# Patient Record
Sex: FEMALE | Race: WHITE | NOT HISPANIC OR LATINO | URBAN - METROPOLITAN AREA
[De-identification: names, ages, dates, MRNs, and addresses within clinical notes are randomized per-mention and may not be internally consistent; named-entity substitution may affect disease eponyms.]

---

## 2017-06-22 ENCOUNTER — EMERGENCY (EMERGENCY)
Facility: HOSPITAL | Age: 32
LOS: 1 days | End: 2017-06-22
Payer: MEDICAID

## 2017-06-22 PROCEDURE — 76817 TRANSVAGINAL US OBSTETRIC: CPT | Mod: 26

## 2017-06-22 PROCEDURE — 99285 EMERGENCY DEPT VISIT HI MDM: CPT

## 2017-06-22 PROCEDURE — 76801 OB US < 14 WKS SINGLE FETUS: CPT | Mod: 26

## 2018-05-13 ENCOUNTER — EMERGENCY (EMERGENCY)
Facility: HOSPITAL | Age: 33
LOS: 1 days | End: 2018-05-13
Payer: MEDICAID

## 2018-05-13 PROCEDURE — 99283 EMERGENCY DEPT VISIT LOW MDM: CPT

## 2019-11-29 ENCOUNTER — EMERGENCY (EMERGENCY)
Facility: HOSPITAL | Age: 34
LOS: 1 days | End: 2019-11-29
Admitting: EMERGENCY MEDICINE
Payer: MEDICAID

## 2019-11-29 ENCOUNTER — TRANSCRIPTION ENCOUNTER (OUTPATIENT)
Age: 34
End: 2019-11-29

## 2019-11-29 PROCEDURE — 74176 CT ABD & PELVIS W/O CONTRAST: CPT | Mod: 26

## 2019-11-29 PROCEDURE — 99285 EMERGENCY DEPT VISIT HI MDM: CPT

## 2019-11-29 PROCEDURE — 76705 ECHO EXAM OF ABDOMEN: CPT | Mod: 26

## 2019-11-29 PROCEDURE — 76830 TRANSVAGINAL US NON-OB: CPT | Mod: 26

## 2019-11-29 PROCEDURE — 76856 US EXAM PELVIC COMPLETE: CPT | Mod: 26

## 2019-11-30 ENCOUNTER — EMERGENCY (EMERGENCY)
Facility: HOSPITAL | Age: 34
LOS: 1 days | End: 2019-11-30
Admitting: EMERGENCY MEDICINE
Payer: MEDICAID

## 2019-11-30 PROCEDURE — 76856 US EXAM PELVIC COMPLETE: CPT | Mod: 26

## 2019-11-30 PROCEDURE — 99285 EMERGENCY DEPT VISIT HI MDM: CPT

## 2019-11-30 PROCEDURE — 76830 TRANSVAGINAL US NON-OB: CPT | Mod: 26

## 2021-01-06 ENCOUNTER — APPOINTMENT (OUTPATIENT)
Dept: GYNECOLOGIC ONCOLOGY | Facility: CLINIC | Age: 36
End: 2021-01-06
Payer: MEDICAID

## 2021-01-06 DIAGNOSIS — Z87.891 PERSONAL HISTORY OF NICOTINE DEPENDENCE: ICD-10-CM

## 2021-01-06 DIAGNOSIS — Z83.3 FAMILY HISTORY OF DIABETES MELLITUS: ICD-10-CM

## 2021-01-06 DIAGNOSIS — Z82.49 FAMILY HISTORY OF ISCHEMIC HEART DISEASE AND OTHER DISEASES OF THE CIRCULATORY SYSTEM: ICD-10-CM

## 2021-01-06 PROCEDURE — 93976 VASCULAR STUDY: CPT | Mod: 59

## 2021-01-06 PROCEDURE — 76830 TRANSVAGINAL US NON-OB: CPT | Mod: 59

## 2021-01-06 PROCEDURE — 76857 US EXAM PELVIC LIMITED: CPT | Mod: 59

## 2021-01-06 PROCEDURE — 99072 ADDL SUPL MATRL&STAF TM PHE: CPT

## 2021-01-06 PROCEDURE — 99204 OFFICE O/P NEW MOD 45 MIN: CPT | Mod: 25

## 2021-01-06 RX ORDER — LEVONORGESTREL AND ETHINYL ESTRADIOL 0.1-0.02MG
0.1-2 KIT ORAL
Refills: 0 | Status: ACTIVE | COMMUNITY

## 2021-01-06 NOTE — CHIEF COMPLAINT
[FreeTextEntry1] : [facility]\par \par [facility] [phone] at [address] [place] [id number]\par

## 2021-01-06 NOTE — HISTORY OF PRESENT ILLNESS
[FreeTextEntry1] : This 36yo  LMP 20 referred by Dr. Bergman for evaluation of a cyst on fallopian tube. History of right oophorectomy in 2019 and multiple laparoscopies with ovarian cystectomies over the years. She reports having an endometrioma removed in the past. On 20, patient underwent emergent laparoscopy at [facility] (Dr. Donna Bess) and was told a cyst was found in right fallopian tube ? rupture and appendix was also involved requiring appendectomy. Pt does not have operative or pathology records today.  Pt reports having chronic pelvic pain for years and feels it is significantly affecting her quality of life especially with taking care of her 3 small children. She has dyspareunia. Menses are heavy, painful and irregular with spotting between periods. She is eager to have relief of symptoms. \par \par Pap smear-2020-reports wnl, h/o abn paps, denies LEEP \par Mammo-never\par Colonoscopy-never  \par \par

## 2021-01-06 NOTE — REVIEW OF SYSTEMS
[Negative] : Musculoskeletal [Abn Vag Bleeding] : abnormal vaginal bleeding [Dyspareunia] : dyspareunia [FreeTextEntry4] : pelvic pain

## 2021-01-06 NOTE — END OF VISIT
[FreeTextEntry3] : Written by Minna SORENSON, acting as a scribe for Dr. Carl Lee.\par This note accurately reflects the work and decisions made by me.\par

## 2021-01-06 NOTE — ASSESSMENT
[FreeTextEntry1] : 36yo female with chronic pelvic pain and recurrent ovarian cysts. Pelvic US today reveals a left adnexal complex area 5.2 x 3.3  x4.7cm. Discussed with patient my recommendation for an MRI pelvis to better evaluate this cystic area and determine her source of pain. Will also need her operative and pathology reports.

## 2021-01-06 NOTE — PHYSICAL EXAM
[Normal] : Bimanual Exam: Normal [de-identified] : Patient was interviewed and examined with chaperone present. Name of chaperone: Minna Hatch

## 2021-02-12 ENCOUNTER — APPOINTMENT (OUTPATIENT)
Dept: GYNECOLOGIC ONCOLOGY | Facility: CLINIC | Age: 36
End: 2021-02-12
Payer: MEDICAID

## 2021-02-12 VITALS
HEART RATE: 89 BPM | HEIGHT: 67 IN | RESPIRATION RATE: 16 BRPM | DIASTOLIC BLOOD PRESSURE: 82 MMHG | BODY MASS INDEX: 29.82 KG/M2 | OXYGEN SATURATION: 98 % | WEIGHT: 190 LBS | SYSTOLIC BLOOD PRESSURE: 122 MMHG

## 2021-02-12 PROCEDURE — 99214 OFFICE O/P EST MOD 30 MIN: CPT

## 2021-02-12 PROCEDURE — 99072 ADDL SUPL MATRL&STAF TM PHE: CPT

## 2021-02-17 NOTE — ASSESSMENT
[FreeTextEntry1] : I discussed with patient that her MRI was WNL. There is no obvious evidence of gynecologic pathology. I explained to the patient that there are a few options that will help keeping her out of the ER  and needing more laparoscopic procedures. I explained to her that the probability of her needing another surgery is lower now that she has her appendix out. I discussed the option of pre dosing with Aleve prior to her cycle which is my least favorite option vs a hysterectomy which i discussed the surgical risk with patient and that at such a young age it is not recommended. I have taken the liberty of referring patient to Dr. Renea Amado for a consultation on what she thinks the best contraceptive would be for patient. Patient states she is willing to have a consultation with her. Otherwise patient is to follow up in June for a follow up ultrasound, I advised the patient if she is pain free she does not have to return to my office. She may resume routine gynecological care as long as she is feeling well. Patient understands that if she develops any symptoms or concerns where she feels like she needs to go to the ER she should call my personally and I will advise her to go to [facility] where my team will be. Patient stated she understood and agreed to comply.

## 2021-02-17 NOTE — REASON FOR VISIT
[FreeTextEntry1] : [place] Location \par \par [facility] [place]. [phone]\par [address] [place] [address]

## 2021-02-17 NOTE — PHYSICAL EXAM
[Normal] : No focal neurologic defects observed [de-identified] : Jaqueline Enamorado Medical assistant chaperoned during results and discussion.

## 2021-02-17 NOTE — HISTORY OF PRESENT ILLNESS
[FreeTextEntry1] : This 35 year old was recently referred by Dr. Amado for evaluation of a cyst on fallopian tube. Patient has a hx of right oophorectomy in 2019 and multiple laparscopies with ovarian cystectomies over the years. Patient was seen at [facility] and had an emergent Laparoscopy and was told a cyst was foung in her right fallopian tube ? rupture and appendix was also involved requiring appendectomy. Patient reports having chronic pelvic pain for years and feels like it is significantly affecting her quality of life. An ultrasound was performed in my office on 1/6/21 which revealed a left adnexal complex area 5.2 x 3.3 x 4.7 cm. I discussed with patient my recommendation to have an MRI and obtain operative and pathology results and return to my office to discuss results. Patient has been on multiple contraceptives and is currently on Lutera. Patient returns to te office today to discuss results and treatment plan. \par \par MRI of the pelvis from 2/3/21 impression revealed Normal findings, s/p right oophorectomy.

## 2021-02-17 NOTE — END OF VISIT
[FreeTextEntry3] : Written by Jaqueline Enamorado, acting as a scribe for Dr. Carl Lee \par This note accurately reflects the work and decisions made by me.

## 2021-02-22 ENCOUNTER — EMERGENCY (EMERGENCY)
Facility: HOSPITAL | Age: 36
LOS: 1 days | Discharge: HOME | End: 2021-02-22
Attending: EMERGENCY MEDICINE
Payer: MEDICAID

## 2021-02-22 VITALS
RESPIRATION RATE: 18 BRPM | HEIGHT: 67 IN | HEART RATE: 81 BPM | OXYGEN SATURATION: 100 % | WEIGHT: 190.04 LBS | TEMPERATURE: 98 F | DIASTOLIC BLOOD PRESSURE: 87 MMHG | SYSTOLIC BLOOD PRESSURE: 132 MMHG

## 2021-02-22 LAB
ALBUMIN SERPL ELPH-MCNC: 4.6 G/DL — SIGNIFICANT CHANGE UP (ref 3.3–5.2)
ALP SERPL-CCNC: 50 U/L — SIGNIFICANT CHANGE UP (ref 40–120)
ALT FLD-CCNC: 14 U/L — SIGNIFICANT CHANGE UP
ANION GAP SERPL CALC-SCNC: 11 MMOL/L — SIGNIFICANT CHANGE UP (ref 5–17)
APPEARANCE UR: CLEAR — SIGNIFICANT CHANGE UP
AST SERPL-CCNC: 18 U/L — SIGNIFICANT CHANGE UP
BASOPHILS # BLD AUTO: 0.08 K/UL — SIGNIFICANT CHANGE UP (ref 0–0.2)
BASOPHILS NFR BLD AUTO: 1.1 % — SIGNIFICANT CHANGE UP (ref 0–2)
BILIRUB SERPL-MCNC: 0.5 MG/DL — SIGNIFICANT CHANGE UP (ref 0.4–2)
BILIRUB UR-MCNC: NEGATIVE — SIGNIFICANT CHANGE UP
BUN SERPL-MCNC: 18 MG/DL — SIGNIFICANT CHANGE UP (ref 8–20)
CALCIUM SERPL-MCNC: 9.1 MG/DL — SIGNIFICANT CHANGE UP (ref 8.6–10.2)
CHLORIDE SERPL-SCNC: 101 MMOL/L — SIGNIFICANT CHANGE UP (ref 98–107)
CO2 SERPL-SCNC: 26 MMOL/L — SIGNIFICANT CHANGE UP (ref 22–29)
COLOR SPEC: YELLOW — SIGNIFICANT CHANGE UP
CREAT SERPL-MCNC: 0.86 MG/DL — SIGNIFICANT CHANGE UP (ref 0.5–1.3)
DIFF PNL FLD: NEGATIVE — SIGNIFICANT CHANGE UP
EOSINOPHIL # BLD AUTO: 0.18 K/UL — SIGNIFICANT CHANGE UP (ref 0–0.5)
EOSINOPHIL NFR BLD AUTO: 2.4 % — SIGNIFICANT CHANGE UP (ref 0–6)
GLUCOSE SERPL-MCNC: 87 MG/DL — SIGNIFICANT CHANGE UP (ref 70–99)
GLUCOSE UR QL: NEGATIVE MG/DL — SIGNIFICANT CHANGE UP
HCG SERPL-ACNC: <4 MIU/ML — SIGNIFICANT CHANGE UP
HCT VFR BLD CALC: 44.3 % — SIGNIFICANT CHANGE UP (ref 34.5–45)
HGB BLD-MCNC: 14.9 G/DL — SIGNIFICANT CHANGE UP (ref 11.5–15.5)
IMM GRANULOCYTES NFR BLD AUTO: 0.1 % — SIGNIFICANT CHANGE UP (ref 0–1.5)
KETONES UR-MCNC: ABNORMAL
LACTATE BLDV-MCNC: 0.6 MMOL/L — SIGNIFICANT CHANGE UP (ref 0.5–2)
LEUKOCYTE ESTERASE UR-ACNC: NEGATIVE — SIGNIFICANT CHANGE UP
LIDOCAIN IGE QN: 21 U/L — LOW (ref 22–51)
LYMPHOCYTES # BLD AUTO: 2.16 K/UL — SIGNIFICANT CHANGE UP (ref 1–3.3)
LYMPHOCYTES # BLD AUTO: 29.2 % — SIGNIFICANT CHANGE UP (ref 13–44)
MCHC RBC-ENTMCNC: 29.8 PG — SIGNIFICANT CHANGE UP (ref 27–34)
MCHC RBC-ENTMCNC: 33.6 GM/DL — SIGNIFICANT CHANGE UP (ref 32–36)
MCV RBC AUTO: 88.6 FL — SIGNIFICANT CHANGE UP (ref 80–100)
MONOCYTES # BLD AUTO: 0.54 K/UL — SIGNIFICANT CHANGE UP (ref 0–0.9)
MONOCYTES NFR BLD AUTO: 7.3 % — SIGNIFICANT CHANGE UP (ref 2–14)
NEUTROPHILS # BLD AUTO: 4.43 K/UL — SIGNIFICANT CHANGE UP (ref 1.8–7.4)
NEUTROPHILS NFR BLD AUTO: 59.9 % — SIGNIFICANT CHANGE UP (ref 43–77)
NITRITE UR-MCNC: NEGATIVE — SIGNIFICANT CHANGE UP
PH UR: 6.5 — SIGNIFICANT CHANGE UP (ref 5–8)
PLATELET # BLD AUTO: 290 K/UL — SIGNIFICANT CHANGE UP (ref 150–400)
POTASSIUM SERPL-MCNC: 3.8 MMOL/L — SIGNIFICANT CHANGE UP (ref 3.5–5.3)
POTASSIUM SERPL-SCNC: 3.8 MMOL/L — SIGNIFICANT CHANGE UP (ref 3.5–5.3)
PROT SERPL-MCNC: 7.5 G/DL — SIGNIFICANT CHANGE UP (ref 6.6–8.7)
PROT UR-MCNC: NEGATIVE MG/DL — SIGNIFICANT CHANGE UP
RBC # BLD: 5 M/UL — SIGNIFICANT CHANGE UP (ref 3.8–5.2)
RBC # FLD: 12.4 % — SIGNIFICANT CHANGE UP (ref 10.3–14.5)
SODIUM SERPL-SCNC: 138 MMOL/L — SIGNIFICANT CHANGE UP (ref 135–145)
SP GR SPEC: 1 — LOW (ref 1.01–1.02)
UROBILINOGEN FLD QL: NEGATIVE MG/DL — SIGNIFICANT CHANGE UP
WBC # BLD: 7.4 K/UL — SIGNIFICANT CHANGE UP (ref 3.8–10.5)
WBC # FLD AUTO: 7.4 K/UL — SIGNIFICANT CHANGE UP (ref 3.8–10.5)

## 2021-02-22 PROCEDURE — 81003 URINALYSIS AUTO W/O SCOPE: CPT

## 2021-02-22 PROCEDURE — 87086 URINE CULTURE/COLONY COUNT: CPT

## 2021-02-22 PROCEDURE — 96374 THER/PROPH/DIAG INJ IV PUSH: CPT

## 2021-02-22 PROCEDURE — 83605 ASSAY OF LACTIC ACID: CPT

## 2021-02-22 PROCEDURE — 36415 COLL VENOUS BLD VENIPUNCTURE: CPT

## 2021-02-22 PROCEDURE — 76856 US EXAM PELVIC COMPLETE: CPT | Mod: 26

## 2021-02-22 PROCEDURE — 83690 ASSAY OF LIPASE: CPT

## 2021-02-22 PROCEDURE — 99284 EMERGENCY DEPT VISIT MOD MDM: CPT | Mod: 25

## 2021-02-22 PROCEDURE — 76856 US EXAM PELVIC COMPLETE: CPT

## 2021-02-22 PROCEDURE — 85025 COMPLETE CBC W/AUTO DIFF WBC: CPT

## 2021-02-22 PROCEDURE — 76830 TRANSVAGINAL US NON-OB: CPT | Mod: 26

## 2021-02-22 PROCEDURE — 99285 EMERGENCY DEPT VISIT HI MDM: CPT

## 2021-02-22 PROCEDURE — 96375 TX/PRO/DX INJ NEW DRUG ADDON: CPT

## 2021-02-22 PROCEDURE — 84702 CHORIONIC GONADOTROPIN TEST: CPT

## 2021-02-22 PROCEDURE — 80053 COMPREHEN METABOLIC PANEL: CPT

## 2021-02-22 PROCEDURE — 96361 HYDRATE IV INFUSION ADD-ON: CPT

## 2021-02-22 PROCEDURE — 76830 TRANSVAGINAL US NON-OB: CPT

## 2021-02-22 RX ORDER — MORPHINE SULFATE 50 MG/1
4 CAPSULE, EXTENDED RELEASE ORAL ONCE
Refills: 0 | Status: DISCONTINUED | OUTPATIENT
Start: 2021-02-22 | End: 2021-02-22

## 2021-02-22 RX ORDER — OXYCODONE HYDROCHLORIDE 5 MG/1
1 TABLET ORAL
Qty: 8 | Refills: 0
Start: 2021-02-22 | End: 2021-02-23

## 2021-02-22 RX ORDER — KETOROLAC TROMETHAMINE 30 MG/ML
30 SYRINGE (ML) INJECTION ONCE
Refills: 0 | Status: DISCONTINUED | OUTPATIENT
Start: 2021-02-22 | End: 2021-02-22

## 2021-02-22 RX ORDER — SODIUM CHLORIDE 9 MG/ML
1000 INJECTION INTRAMUSCULAR; INTRAVENOUS; SUBCUTANEOUS ONCE
Refills: 0 | Status: COMPLETED | OUTPATIENT
Start: 2021-02-22 | End: 2021-02-22

## 2021-02-22 RX ORDER — OXYCODONE HYDROCHLORIDE 5 MG/1
10 TABLET ORAL ONCE
Refills: 0 | Status: DISCONTINUED | OUTPATIENT
Start: 2021-02-22 | End: 2021-02-22

## 2021-02-22 RX ADMIN — SODIUM CHLORIDE 1000 MILLILITER(S): 9 INJECTION INTRAMUSCULAR; INTRAVENOUS; SUBCUTANEOUS at 19:36

## 2021-02-22 RX ADMIN — SODIUM CHLORIDE 1000 MILLILITER(S): 9 INJECTION INTRAMUSCULAR; INTRAVENOUS; SUBCUTANEOUS at 18:02

## 2021-02-22 RX ADMIN — Medication 30 MILLIGRAM(S): at 19:38

## 2021-02-22 RX ADMIN — OXYCODONE HYDROCHLORIDE 10 MILLIGRAM(S): 5 TABLET ORAL at 20:38

## 2021-02-22 RX ADMIN — MORPHINE SULFATE 4 MILLIGRAM(S): 50 CAPSULE, EXTENDED RELEASE ORAL at 18:03

## 2021-02-22 NOTE — ED PROVIDER NOTE - PATIENT PORTAL LINK FT
You can access the FollowMyHealth Patient Portal offered by [facility] by registering at the following website: [email or web address]. By joining [employer]’s FollowMyHealth portal, you will also be able to view your health information using other applications (apps) compatible with our system.

## 2021-02-22 NOTE — ED PROVIDER NOTE - CARE PROVIDER_API CALL
Carl Lee)  Gynecologic Oncology; Obstetrics and Gynecology  [address] [place] [address]  Phone: [phone]  Fax: [phone]  Follow Up Time:

## 2021-02-22 NOTE — ED ADULT NURSE NOTE - OBJECTIVE STATEMENT
35 years old female presents to ED with sudden onset of pelvic pain today. Patient A&Ox4. Patient s/p oophorectomy. Patient report hx of ovarian cyst and that her symptoms are similar. LMP feb 1st, 2021. Patient denies any trouble urinating, hematuria or nausea.

## 2021-02-22 NOTE — ED PROVIDER NOTE - PHYSICAL EXAMINATION
General:     NAD, well-nourished, well-appearing  Head:     NC/AT, EOMI, oral mucosa moist  Neck:     trachea midline  Lungs:     CTA b/l, no w/r/r  CVS:     S1S2, RRR, no m/g/r  Abd:     +BS, s/tenderness to palpation suprapubic, no rebound /nd, no organomegaly  Ext:    2+ radial and pedal pulses, no c/c/e  Neuro: AAOx3, no sensory/motor deficits General:     NAD, well-nourished, well-appearing  Head:     NC/AT, EOMI, oral mucosa moist  Neck:     trachea midline  Lungs:     CTA b/l, no w/r/r  CVS:     S1S2, RRR, no m/g/r  Abd:     +BS, s/TTP @ suprapubic, no rebound /nd, no organomegaly  Ext:    2+ radial and pedal pulses, no c/c/e  Neuro: AAOx3, no sensory/motor deficits

## 2021-02-22 NOTE — ED PROVIDER NOTE - NS ED ROS FT
Constitutional: (-) fever  (-)chills  (-)sweats  Eyes/ENT: (-) blurry vision, (-) epistaxis  (-)rhinorrhea   (-) sore throat    Cardiovascular: (-) chest pain, (-) palpitations (-) edema   Respiratory: (-) cough, (-) shortness of breath   Gastrointestinal: (+)nausea  (-)vomiting, (-) diarrhea  (-) abdominal pain   :  (-)dysuria, (-)frequency, (-)urgency, (-)hematuria, (+) pelvic pain   Musculoskeletal: (-) neck pain, (-) back pain, (-) joint pain  Integumentary: (-) rash, (-) edema  Neurological: (-) headache, (-) altered mental status  (-)LOC Constitutional: (-) fever  (-)chills  (-)sweats  Eyes/ENT: (-) blurry vision, (-) epistaxis  (-)rhinorrhea   (-) sore throat    Cardiovascular: (-) chest pain, (-) palpitations (-) edema   Respiratory: (-) cough, (-) shortness of breath   Gastrointestinal: (+)nausea  (-)vomiting, (-) diarrhea  (+) abdominal pain   :  (-)dysuria, (-)frequency, (-)urgency, (-)hematuria, (+) pelvic pain   Musculoskeletal: (-) neck pain, (-) back pain, (-) joint pain  Integumentary: (-) rash, (-) edema  Neurological: (-) headache, (-) altered mental status  (-)LOC

## 2021-02-22 NOTE — CONSULT NOTE ADULT - ASSESSMENT
35y  LMP 2021 with long history of chronic pelvic pain with recurrent ovarian cysts and multiple laparoscopies presenting with acute onset of severe suprapubic pain, intermittent, progressive but now improved during ED triage. Patient found to be hemodynamically stable with normal vital signs. Blood work negative for inflammatory process. Sonogram negative for pathology and only significant for a corpus luteal cyst in the left ovary. Benign abdominal examination. Pain likely related to ovulation in setting of suspected endometriosis and adhesion disease. Patient was counselled extensively regarding the role of NSAIDs in managing ovulatory and/or endometriosis pain in addition to hormone therapy. She has an appointment with a hormone specialist in 2 weeks. She discussed the need of having a pain management plan. Recommend 3 day course of Naproxen 500mg PO BID and will given Oxycodone 5mg IR as needed for breakthrough pain. Patient is requesting a earlier outpatient appointment with Dr. Lee as well. All her questions were answered and patient agreed with out outpatient pain management plan/followup.

## 2021-02-22 NOTE — ED PROVIDER NOTE - OBJECTIVE STATEMENT
34 y/o F with no PMHx, PSHx of oophorectomy, x2 C-sections presents to the ED c/o pelvic pain onset today. Pt reports history of ovary cysts and states that her symptoms are similar. Pt reports feeling nauseous and had normal bowel movement this morning. Pt states that her LMP was x2 weeks ago.   Denies fever, vomiting, pain with urination 36 y/o F with PMH signif for Ovarian Torsion (s/p right oophorectomy), x2 C-sections presents to the ED c/o abd pain--suprapubic, cramping, intermittent, began 1hr ago, progressively worsening.  pt reports history of ovary cysts and states that her symptoms are similar. Pt reports feeling nauseous and had normal bowel movement this morning. denies dysuria, frequency, urgency.  denies f/c/s. denies vomiting. denies diarrha. Pt states that her LMP was x2 weeks ago.   PMH: ovarian torsion  SOCiAL: No tobacco/illicit substance use/socialEtOH

## 2021-02-22 NOTE — ED PROVIDER NOTE - NSFOLLOWUPINSTRUCTIONS_ED_ALL_ED_FT
- Prescription sent to pharmacy.  - Please call office of Dr. Lee in afternoon to schedule earlier appointment.  - Please bring all documentation from your ED visit to any related future follow up appointment.  - Please call to schedule follow up appointment with your primary care physician within 24-48 hours.  - Please seek immediate medical attention for any new/worsening, signs/symptoms, or concerns.    Feel better!       Pelvic Pain in Women    WHAT YOU NEED TO KNOW:    What do I need to know about pelvic pain? You may have pain on one or both sides of your pelvis. Pelvic pain may occur with certain body positions or activities, such as when you have sex or a bowel movement. It may worsen during your monthly period or after you sit or stand for a long time. Chronic pelvic pain is pain that continues for longer than 6 months.    What causes pelvic pain in women?   •Gynecologic conditions, such as pelvic inflammatory disease (PID), endometriosis, or uterine fibroids      •Bowel and bladder conditions, such as irritable bowel syndrome, bladder inflammation, or tumors       •Muscle and nerve conditions, such as swelling or weakness of your pelvic muscles, or damage to the nerves of your pelvic area (neuropathy)      •Psychological issues as a result of physical or sexual abuse, or drug abuse      How is pelvic pain treated?   •Pain medicine may be given in pills, injections, or creams to relieve your pain.       •Hormones may be given if your pain gets worse with your menstrual cycle.      •Antibiotics may be given if your pain is caused by infection.      •Surgery may be done if other treatments do not relieve your pain.       How can I manage my pelvic pain?   •Keep a pain diary. Write down when your pain happens, how severe it is, and any other symptoms you have with your pain. A diary will help you keep track of pain cycles. It may also help your healthcare provider find out what is causing your pain.      •Learn ways to relax. Deep breathing, meditation, and relaxation techniques can help decrease your pain. When you are tense, your pain may increase.      •Change the foods you eat if you have irritable bowel syndrome. Ask your healthcare provider about the best foods for you.       Call 911 for any of the following:   •You have severe chest pain and sudden trouble breathing.          When should I seek immediate care?   •You have heavy or unusual vaginal bleeding, and you feel lightheaded or faint.          When should I contact my healthcare provider?   •You have pelvic pain that does not go away after you take pain medicine.      •You develop new symptoms or your symptoms are worse than before.      •You have questions or concerns about your condition or care.      CARE AGREEMENT:    You have the right to help plan your care. Learn about your health condition and how it may be treated. Discuss treatment options with your healthcare providers to decide what care you want to receive. You always have the right to refuse treatment.

## 2021-02-22 NOTE — ED PROVIDER NOTE - PROGRESS NOTE DETAILS
Spoke to ultra-sound to expedite ultra-sound given high risk of torsion. YAS Mayes: Patient evaluated by intake physician. HPI/ROS/PE as noted above. Will follow up plan per intake physician and continue to assess patient. YAS Mayes: Dr. Lee resident to see patient. YAS Mayes: Patient evaluated by GYN resident. Medications prescribed by GYN. Follow up with Dr. Lee as outpatient.

## 2021-02-23 LAB
CULTURE RESULTS: SIGNIFICANT CHANGE UP
SPECIMEN SOURCE: SIGNIFICANT CHANGE UP

## 2021-02-23 RX ORDER — OXYCODONE HYDROCHLORIDE 5 MG/1
1 TABLET ORAL
Qty: 12 | Refills: 0
Start: 2021-02-23

## 2021-02-26 ENCOUNTER — APPOINTMENT (OUTPATIENT)
Dept: GYNECOLOGIC ONCOLOGY | Facility: CLINIC | Age: 36
End: 2021-02-26
Payer: MEDICAID

## 2021-02-26 VITALS
SYSTOLIC BLOOD PRESSURE: 121 MMHG | HEIGHT: 67 IN | DIASTOLIC BLOOD PRESSURE: 82 MMHG | HEART RATE: 85 BPM | OXYGEN SATURATION: 98 % | WEIGHT: 190 LBS | BODY MASS INDEX: 29.82 KG/M2 | RESPIRATION RATE: 16 BRPM

## 2021-02-26 PROCEDURE — 99072 ADDL SUPL MATRL&STAF TM PHE: CPT

## 2021-02-26 PROCEDURE — 99214 OFFICE O/P EST MOD 30 MIN: CPT

## 2021-03-01 NOTE — ASSESSMENT
[FreeTextEntry1] : I discussed with patient that based off her imaging while in the hospital and prior MRI she may benefit from being on some type of oral contraceptive to help suppress ovulation. Patient states she scheduled an appt with Dr. Amado to discuss which OCP she may benefit from.

## 2021-03-01 NOTE — END OF VISIT
[FreeTextEntry3] : Written by Iasbel Lua, acting as a scribe for Dr. Carl Lee.\par This note accurately reflects the work and decisions made by me\par

## 2021-03-01 NOTE — PHYSICAL EXAM
[Normal] : No focal neurologic defects observed [de-identified] : Isabel Lua MA was present the entire time of discussion

## 2021-03-01 NOTE — REASON FOR VISIT
[FreeTextEntry1] : [facility]\par \par [facility] [phone] at [address] [place] [id number]\par

## 2021-03-01 NOTE — HISTORY OF PRESENT ILLNESS
[FreeTextEntry1] : 34 y/o with a history of chronic pelvic pain, right oophorectomy in 2019 and multiple laparoscopies over the years for ovarian cyst referred to me by Dr. Bergman to evaluate possible left adnexal/fallopian tube cyst. Patient was recently seen in the ED for severe pelvic pain. Per patient imaging done in the hospital revealed a follicular cyst, otherwise normal. Prior MRI pelvis from 2/3/2021 was unremarkable.

## 2021-06-25 ENCOUNTER — APPOINTMENT (OUTPATIENT)
Dept: GYNECOLOGIC ONCOLOGY | Facility: CLINIC | Age: 36
End: 2021-06-25
Payer: MEDICAID

## 2021-06-27 ENCOUNTER — EMERGENCY (EMERGENCY)
Facility: HOSPITAL | Age: 36
LOS: 1 days | End: 2021-06-27
Admitting: EMERGENCY MEDICINE
Payer: MEDICAID

## 2021-06-27 PROCEDURE — 93975 VASCULAR STUDY: CPT | Mod: 26

## 2021-06-27 PROCEDURE — 76830 TRANSVAGINAL US NON-OB: CPT | Mod: 26

## 2021-06-27 PROCEDURE — 99285 EMERGENCY DEPT VISIT HI MDM: CPT

## 2021-06-27 PROCEDURE — 99213 OFFICE O/P EST LOW 20 MIN: CPT

## 2021-06-27 PROCEDURE — 76856 US EXAM PELVIC COMPLETE: CPT | Mod: 26,59

## 2021-06-28 ENCOUNTER — EMERGENCY (EMERGENCY)
Facility: HOSPITAL | Age: 36
LOS: 1 days | End: 2021-06-28
Payer: MEDICAID

## 2021-06-28 PROCEDURE — 99285 EMERGENCY DEPT VISIT HI MDM: CPT

## 2021-06-28 PROCEDURE — 76830 TRANSVAGINAL US NON-OB: CPT | Mod: 26

## 2021-06-29 PROBLEM — R10.2 CHRONIC PELVIC PAIN IN FEMALE: Status: ACTIVE | Noted: 2021-01-06

## 2021-06-30 ENCOUNTER — APPOINTMENT (OUTPATIENT)
Dept: OBGYN | Facility: CLINIC | Age: 36
End: 2021-06-30
Payer: MEDICAID

## 2021-06-30 ENCOUNTER — NON-APPOINTMENT (OUTPATIENT)
Age: 36
End: 2021-06-30

## 2021-06-30 ENCOUNTER — EMERGENCY (EMERGENCY)
Facility: HOSPITAL | Age: 36
LOS: 1 days | End: 2021-06-30
Admitting: EMERGENCY MEDICINE
Payer: MEDICAID

## 2021-06-30 VITALS
SYSTOLIC BLOOD PRESSURE: 110 MMHG | HEIGHT: 67 IN | BODY MASS INDEX: 29.82 KG/M2 | WEIGHT: 190 LBS | DIASTOLIC BLOOD PRESSURE: 74 MMHG

## 2021-06-30 DIAGNOSIS — R10.32 LEFT LOWER QUADRANT PAIN: ICD-10-CM

## 2021-06-30 DIAGNOSIS — Z87.42 PERSONAL HISTORY OF OTHER DISEASES OF THE FEMALE GENITAL TRACT: ICD-10-CM

## 2021-06-30 DIAGNOSIS — Z32.01 ENCOUNTER FOR PREGNANCY TEST, RESULT POSITIVE: ICD-10-CM

## 2021-06-30 DIAGNOSIS — O46.90 ANTEPARTUM HEMORRHAGE, UNSPECIFIED, UNSPECIFIED TRIMESTER: ICD-10-CM

## 2021-06-30 LAB
HCG UR QL: POSITIVE
QUALITY CONTROL: YES

## 2021-06-30 PROCEDURE — 99213 OFFICE O/P EST LOW 20 MIN: CPT

## 2021-06-30 PROCEDURE — 99285 EMERGENCY DEPT VISIT HI MDM: CPT

## 2021-06-30 PROCEDURE — 76817 TRANSVAGINAL US OBSTETRIC: CPT | Mod: 26

## 2021-06-30 PROCEDURE — 76801 OB US < 14 WKS SINGLE FETUS: CPT | Mod: 26

## 2021-06-30 PROCEDURE — 99072 ADDL SUPL MATRL&STAF TM PHE: CPT

## 2021-06-30 NOTE — REVIEW OF SYSTEMS
[Abdominal Pain] : abdominal pain [Vomiting] : vomiting [Pelvic Pain] : pelvic pain [Abn Vag Bleeding] : abnormal vaginal bleeding [Nl] : Integumentary

## 2021-06-30 NOTE — CHIEF COMPLAINT
[Urgent Visit] : Urgent Visit [FreeTextEntry1] : Pt reports Left sided abd pain. Pos urine pregnancy test today. Pt reports VB, nausea.\par Pt was seen at [facility] Monday, Left ovarian cyst.\par reports givne to Dr De Los Santos and ER Dr.\par [place] emergency transport to take pt to [facility] ER for r/o ectopic.

## 2021-06-30 NOTE — PHYSICAL EXAM
[Awake] : awake [Alert] : alert [Acute Distress] : acute distress [Oriented x3] : oriented to person, place, and time [Normal Mental Status] : the patient was oriented to person, place and time [Appropriate] : appropriate [Agitated] : agitated [Anxious] : anxious [Depressed Mood] : not depressed [Flat Affect] : affect not flat [de-identified] : deferred

## 2021-06-30 NOTE — HISTORY OF PRESENT ILLNESS
[Pain] : pelvic pain [Pregnancy] : pregnancy [BCP] : BCP [Definite:  ___ (Date)] : the last menstrual period was [unfilled] [HCG+: ___(Date)] : a positive HCG was recorded on [unfilled] [On BCP at conception] : the patient was on BCP at conception [Sexually Active] : is sexually active [Irregular Menses] : normal menses [FreeTextEntry5] : ovarian cyst, Left

## 2021-07-01 LAB
ABO + RH PNL BLD: NORMAL
BASOPHILS # BLD AUTO: 0.07 K/UL
BASOPHILS NFR BLD AUTO: 1.3 %
BLD GP AB SCN SERPL QL: NORMAL
EOSINOPHIL # BLD AUTO: 0.42 K/UL
EOSINOPHIL NFR BLD AUTO: 7.5 %
HCG SERPL-MCNC: 458 MIU/ML
HCT VFR BLD CALC: 39.5 %
HGB BLD-MCNC: 12 G/DL
IMM GRANULOCYTES NFR BLD AUTO: 0.2 %
LYMPHOCYTES # BLD AUTO: 1.79 K/UL
LYMPHOCYTES NFR BLD AUTO: 32.1 %
MAN DIFF?: NORMAL
MCHC RBC-ENTMCNC: 29.6 PG
MCHC RBC-ENTMCNC: 30.4 GM/DL
MCV RBC AUTO: 97.5 FL
MONOCYTES # BLD AUTO: 0.46 K/UL
MONOCYTES NFR BLD AUTO: 8.2 %
NEUTROPHILS # BLD AUTO: 2.83 K/UL
NEUTROPHILS NFR BLD AUTO: 50.7 %
PLATELET # BLD AUTO: 250 K/UL
RBC # BLD: 4.05 M/UL
RBC # FLD: 13.4 %
WBC # FLD AUTO: 5.58 K/UL

## 2021-07-02 ENCOUNTER — APPOINTMENT (OUTPATIENT)
Dept: OBGYN | Facility: CLINIC | Age: 36
End: 2021-07-02
Payer: MEDICAID

## 2021-07-02 ENCOUNTER — APPOINTMENT (OUTPATIENT)
Dept: GYNECOLOGIC ONCOLOGY | Facility: CLINIC | Age: 36
End: 2021-07-02
Payer: MEDICAID

## 2021-07-02 VITALS — OXYGEN SATURATION: 100 % | HEART RATE: 94 BPM | DIASTOLIC BLOOD PRESSURE: 77 MMHG | SYSTOLIC BLOOD PRESSURE: 113 MMHG

## 2021-07-02 DIAGNOSIS — G89.29 PELVIC AND PERINEAL PAIN: ICD-10-CM

## 2021-07-02 DIAGNOSIS — O03.9 COMPLETE OR UNSPECIFIED SPONTANEOUS ABORTION W/OUT COMPLICATION: ICD-10-CM

## 2021-07-02 DIAGNOSIS — N83.209 UNSPECIFIED OVARIAN CYST, UNSPECIFIED SIDE: ICD-10-CM

## 2021-07-02 DIAGNOSIS — O36.80X0 PREGNANCY WITH INCONCLUSIVE FETAL VIABILITY, NOT APPLICABLE OR UNSPECIFIED: ICD-10-CM

## 2021-07-02 DIAGNOSIS — R10.2 PELVIC AND PERINEAL PAIN: ICD-10-CM

## 2021-07-02 PROCEDURE — 76857 US EXAM PELVIC LIMITED: CPT | Mod: 59

## 2021-07-02 PROCEDURE — 99072 ADDL SUPL MATRL&STAF TM PHE: CPT

## 2021-07-02 PROCEDURE — 76830 TRANSVAGINAL US NON-OB: CPT | Mod: 59

## 2021-07-02 PROCEDURE — 99213 OFFICE O/P EST LOW 20 MIN: CPT | Mod: 25

## 2021-07-02 PROCEDURE — 99213 OFFICE O/P EST LOW 20 MIN: CPT

## 2021-07-02 RX ORDER — CEPHALEXIN 500 MG/1
500 CAPSULE ORAL
Qty: 12 | Refills: 0 | Status: ACTIVE | COMMUNITY
Start: 2021-06-27

## 2021-07-02 NOTE — PLAN
[FreeTextEntry1] : Repeat beta hcg today \par Warning s/s rev'd for bleeding, infection.\par Pt advised beta hcg is decreasing appropriately for miscarriage.\par Nothing in vagina, pads to monitor bleeding.\par Repeat beta hcg in 72 hours, d/t holiday. Rx given for pt to have this done at [facility].\par Cytotec not given d/t decreasing beta\par comfort measures rev'd, ibuprofen, hot pack, rest.\par \par Plan to follow betas to zero, f/u sono in 2 weeks.\par Pt desires hysterectomy, pt to see MD to discuss and consult regarding this.\par \par Destiny Buckley CM

## 2021-07-02 NOTE — REASON FOR VISIT
[Follow-Up] : a follow-up evaluation of [FreeTextEntry2] : Pt here for repeat beta hcg. Was seen at [facility] ER 6/28 beta 911, down to 480 on 6/30. Pt reports bleeding, cramping. Had sono today with GYN ONC.

## 2021-07-04 LAB — HCG SERPL-MCNC: 259 MIU/ML

## 2021-07-07 ENCOUNTER — NON-APPOINTMENT (OUTPATIENT)
Age: 36
End: 2021-07-07

## 2021-07-07 NOTE — ASSESSMENT
[FreeTextEntry1] : Ultrasound performed in office today revealed- see report for findings. \par \par I discussed with patient her ultrasound today was WNL. I am ordering a STAT HCG quantitative blood test to be done STAT to make sure patient is completing miscarriage and her HCG number is trending down. Patient understands that if number is still high, she will receive a call from my office to go to [facility] ER. \par \par I discussed with patient that she could always fall back on the option of Hysterectomy if she continues to have severe pelvic pain, but advised her that at her age, I would like to continue to try to manage her medically. Patient stated she understood and agreed to comply.

## 2021-07-07 NOTE — HISTORY OF PRESENT ILLNESS
[FreeTextEntry1] : This 35 year old with a hx of chronic pelvic pain, right oophorectomy in 2019 and multiple laparoscopies over the years for ovarian cyst. Patient is being followed in my office for possible left adnexal/fallopian tube cyst. Patient was seen in the ED for severe pelvic pain and as per patient imaging done at the hospital revealed follicular cyst, otherwise normal. Patients MRI or the pelvic from 2/3/21 was unremarkable. I discussed with patient that based of her imaging done at the hospital, she may benefit from an oral contraceptive to help suppress ovulation. Patient was advised to follow up with Dr. Amado for OCP . Patient returns to the office today for a follow up ultrasound. Patient states that she recently found out she was pregnant on 6/30/21 but since then has had a miscarriage. Patient is having VB at this time. Patient states she takes her OCP every day at the same time.

## 2021-07-07 NOTE — REASON FOR VISIT
[FreeTextEntry1] : [place] Location \par \par [facility] [place]. [phone]\par [address] [place] [address]

## 2021-07-07 NOTE — PHYSICAL EXAM
[Normal] : Mood and affect: Normal [de-identified] : Jaqueline Enamorado Medical assistant chaperoned during results and discussion.

## 2021-07-12 ENCOUNTER — NON-APPOINTMENT (OUTPATIENT)
Age: 36
End: 2021-07-12

## 2021-11-15 ENCOUNTER — FORM ENCOUNTER (OUTPATIENT)
Age: 36
End: 2021-11-15

## 2022-05-24 ENCOUNTER — EMERGENCY (EMERGENCY)
Facility: HOSPITAL | Age: 37
LOS: 1 days | Discharge: HOME | End: 2022-05-24
Attending: EMERGENCY MEDICINE
Payer: MEDICARE

## 2022-05-24 VITALS
HEIGHT: 68 IN | DIASTOLIC BLOOD PRESSURE: 86 MMHG | WEIGHT: 179.9 LBS | TEMPERATURE: 98 F | OXYGEN SATURATION: 100 % | HEART RATE: 86 BPM | RESPIRATION RATE: 18 BRPM | SYSTOLIC BLOOD PRESSURE: 122 MMHG

## 2022-05-24 DIAGNOSIS — Z90.721 ACQUIRED ABSENCE OF OVARIES, UNILATERAL: Chronic | ICD-10-CM

## 2022-05-24 LAB
ALBUMIN SERPL ELPH-MCNC: 4.3 G/DL — SIGNIFICANT CHANGE UP (ref 3.3–5.2)
ALP SERPL-CCNC: 47 U/L — SIGNIFICANT CHANGE UP (ref 40–120)
ALT FLD-CCNC: 13 U/L — SIGNIFICANT CHANGE UP
ANION GAP SERPL CALC-SCNC: 12 MMOL/L — SIGNIFICANT CHANGE UP (ref 5–17)
APPEARANCE UR: CLEAR — SIGNIFICANT CHANGE UP
APTT BLD: 31.1 SEC — SIGNIFICANT CHANGE UP (ref 27.5–35.5)
AST SERPL-CCNC: 15 U/L — SIGNIFICANT CHANGE UP
BASOPHILS # BLD AUTO: 0.08 K/UL — SIGNIFICANT CHANGE UP (ref 0–0.2)
BASOPHILS NFR BLD AUTO: 1 % — SIGNIFICANT CHANGE UP (ref 0–2)
BILIRUB SERPL-MCNC: <0.2 MG/DL — LOW (ref 0.4–2)
BILIRUB UR-MCNC: NEGATIVE — SIGNIFICANT CHANGE UP
BUN SERPL-MCNC: 21 MG/DL — HIGH (ref 8–20)
CALCIUM SERPL-MCNC: 8.4 MG/DL — LOW (ref 8.6–10.2)
CHLORIDE SERPL-SCNC: 103 MMOL/L — SIGNIFICANT CHANGE UP (ref 98–107)
CO2 SERPL-SCNC: 23 MMOL/L — SIGNIFICANT CHANGE UP (ref 22–29)
COLOR SPEC: YELLOW — SIGNIFICANT CHANGE UP
CREAT SERPL-MCNC: 0.91 MG/DL — SIGNIFICANT CHANGE UP (ref 0.5–1.3)
DIFF PNL FLD: NEGATIVE — SIGNIFICANT CHANGE UP
EGFR: 83 ML/MIN/1.73M2 — SIGNIFICANT CHANGE UP
EOSINOPHIL # BLD AUTO: 0.62 K/UL — HIGH (ref 0–0.5)
EOSINOPHIL NFR BLD AUTO: 7.5 % — HIGH (ref 0–6)
GLUCOSE SERPL-MCNC: 94 MG/DL — SIGNIFICANT CHANGE UP (ref 70–99)
GLUCOSE UR QL: NEGATIVE MG/DL — SIGNIFICANT CHANGE UP
HCG SERPL-ACNC: <4 MIU/ML — SIGNIFICANT CHANGE UP
HCT VFR BLD CALC: 41.6 % — SIGNIFICANT CHANGE UP (ref 34.5–45)
HGB BLD-MCNC: 13.7 G/DL — SIGNIFICANT CHANGE UP (ref 11.5–15.5)
HIV 1 & 2 AB SERPL IA.RAPID: SIGNIFICANT CHANGE UP
IMM GRANULOCYTES NFR BLD AUTO: 0.8 % — SIGNIFICANT CHANGE UP (ref 0–1.5)
INR BLD: 0.91 RATIO — SIGNIFICANT CHANGE UP (ref 0.88–1.16)
KETONES UR-MCNC: NEGATIVE — SIGNIFICANT CHANGE UP
LEUKOCYTE ESTERASE UR-ACNC: NEGATIVE — SIGNIFICANT CHANGE UP
LYMPHOCYTES # BLD AUTO: 2.17 K/UL — SIGNIFICANT CHANGE UP (ref 1–3.3)
LYMPHOCYTES # BLD AUTO: 26.3 % — SIGNIFICANT CHANGE UP (ref 13–44)
MCHC RBC-ENTMCNC: 30.1 PG — SIGNIFICANT CHANGE UP (ref 27–34)
MCHC RBC-ENTMCNC: 32.9 GM/DL — SIGNIFICANT CHANGE UP (ref 32–36)
MCV RBC AUTO: 91.4 FL — SIGNIFICANT CHANGE UP (ref 80–100)
MONOCYTES # BLD AUTO: 0.54 K/UL — SIGNIFICANT CHANGE UP (ref 0–0.9)
MONOCYTES NFR BLD AUTO: 6.6 % — SIGNIFICANT CHANGE UP (ref 2–14)
NEUTROPHILS # BLD AUTO: 4.76 K/UL — SIGNIFICANT CHANGE UP (ref 1.8–7.4)
NEUTROPHILS NFR BLD AUTO: 57.8 % — SIGNIFICANT CHANGE UP (ref 43–77)
NITRITE UR-MCNC: NEGATIVE — SIGNIFICANT CHANGE UP
PH UR: 7 — SIGNIFICANT CHANGE UP (ref 5–8)
PLATELET # BLD AUTO: 289 K/UL — SIGNIFICANT CHANGE UP (ref 150–400)
POTASSIUM SERPL-MCNC: 4.3 MMOL/L — SIGNIFICANT CHANGE UP (ref 3.5–5.3)
POTASSIUM SERPL-SCNC: 4.3 MMOL/L — SIGNIFICANT CHANGE UP (ref 3.5–5.3)
PROT SERPL-MCNC: 7.2 G/DL — SIGNIFICANT CHANGE UP (ref 6.6–8.7)
PROT UR-MCNC: 15 MG/DL
PROTHROM AB SERPL-ACNC: 10.6 SEC — SIGNIFICANT CHANGE UP (ref 10.5–13.4)
RBC # BLD: 4.55 M/UL — SIGNIFICANT CHANGE UP (ref 3.8–5.2)
RBC # FLD: 12.8 % — SIGNIFICANT CHANGE UP (ref 10.3–14.5)
SODIUM SERPL-SCNC: 138 MMOL/L — SIGNIFICANT CHANGE UP (ref 135–145)
SP GR SPEC: 1.01 — SIGNIFICANT CHANGE UP (ref 1.01–1.02)
UROBILINOGEN FLD QL: NEGATIVE MG/DL — SIGNIFICANT CHANGE UP
WBC # BLD: 8.24 K/UL — SIGNIFICANT CHANGE UP (ref 3.8–10.5)
WBC # FLD AUTO: 8.24 K/UL — SIGNIFICANT CHANGE UP (ref 3.8–10.5)

## 2022-05-24 PROCEDURE — 76856 US EXAM PELVIC COMPLETE: CPT | Mod: 26

## 2022-05-24 PROCEDURE — 76830 TRANSVAGINAL US NON-OB: CPT | Mod: 26

## 2022-05-24 PROCEDURE — 99285 EMERGENCY DEPT VISIT HI MDM: CPT

## 2022-05-24 RX ORDER — MORPHINE SULFATE 50 MG/1
4 CAPSULE, EXTENDED RELEASE ORAL ONCE
Refills: 0 | Status: DISCONTINUED | OUTPATIENT
Start: 2022-05-24 | End: 2022-05-24

## 2022-05-24 RX ORDER — SODIUM CHLORIDE 9 MG/ML
1000 INJECTION INTRAMUSCULAR; INTRAVENOUS; SUBCUTANEOUS ONCE
Refills: 0 | Status: COMPLETED | OUTPATIENT
Start: 2022-05-24 | End: 2022-05-24

## 2022-05-24 RX ORDER — ONDANSETRON 8 MG/1
4 TABLET, FILM COATED ORAL ONCE
Refills: 0 | Status: COMPLETED | OUTPATIENT
Start: 2022-05-24 | End: 2022-05-24

## 2022-05-24 RX ADMIN — ONDANSETRON 4 MILLIGRAM(S): 8 TABLET, FILM COATED ORAL at 19:51

## 2022-05-24 RX ADMIN — MORPHINE SULFATE 4 MILLIGRAM(S): 50 CAPSULE, EXTENDED RELEASE ORAL at 20:06

## 2022-05-24 RX ADMIN — MORPHINE SULFATE 4 MILLIGRAM(S): 50 CAPSULE, EXTENDED RELEASE ORAL at 19:51

## 2022-05-24 RX ADMIN — MORPHINE SULFATE 4 MILLIGRAM(S): 50 CAPSULE, EXTENDED RELEASE ORAL at 22:25

## 2022-05-24 RX ADMIN — SODIUM CHLORIDE 1000 MILLILITER(S): 9 INJECTION INTRAMUSCULAR; INTRAVENOUS; SUBCUTANEOUS at 20:52

## 2022-05-24 RX ADMIN — SODIUM CHLORIDE 1000 MILLILITER(S): 9 INJECTION INTRAMUSCULAR; INTRAVENOUS; SUBCUTANEOUS at 19:51

## 2022-05-24 NOTE — ED PROVIDER NOTE - NSFOLLOWUPINSTRUCTIONS_ED_ALL_ED_FT
Please take ibuprofen 600mg every 6 hours as needed for pain  Please take tylenol 650mg every 6hours as needed for pain  Follow up with OBGYN in 1-2 days  Return to ER for new or worsening symptoms

## 2022-05-24 NOTE — ED PROVIDER NOTE - PROGRESS NOTE DETAILS
Nanette Rosas PA :  PT evaluated by intake physician. HPI/PE/ROS as noted above. Will follow up plan per intake physician  labs unremarkable negative hcg , ua neg, US dominant left ovarian follicle, no torsion  pt still c/o severe LLQ pain radiating to back, notes hx kidney stones, pt agreeable w plan to obtain CT to r/o kidney stone or other etiology CT no kidney stones or other acute findings, noted 1.4cm L ovarian cyst- pt had US stating it was a dominant follicle. also noted degenerative disc changes in L-spine, pt states she is aware.  pt feeling better at this time, has a GYN she will follow up with .advised NSAIDs prn for pain and return precautions

## 2022-05-24 NOTE — ED PROVIDER NOTE - NS ED ATTENDING STATEMENT MOD
This was a shared visit with the CLIFF. I reviewed and verified the documentation and independently performed the documented:

## 2022-05-24 NOTE — ED PROVIDER NOTE - PATIENT PORTAL LINK FT
You can access the FollowMyHealth Patient Portal offered by [facility] by registering at the following website: [email or web address]. By joining [employer]’s FollowMyHealth portal, you will also be able to view your health information using other applications (apps) compatible with our system.

## 2022-05-25 VITALS
DIASTOLIC BLOOD PRESSURE: 70 MMHG | RESPIRATION RATE: 18 BRPM | SYSTOLIC BLOOD PRESSURE: 108 MMHG | TEMPERATURE: 98 F | HEART RATE: 74 BPM | OXYGEN SATURATION: 100 %

## 2022-05-25 PROCEDURE — 86703 HIV-1/HIV-2 1 RESULT ANTBDY: CPT

## 2022-05-25 PROCEDURE — 80053 COMPREHEN METABOLIC PANEL: CPT

## 2022-05-25 PROCEDURE — 74176 CT ABD & PELVIS W/O CONTRAST: CPT | Mod: MA

## 2022-05-25 PROCEDURE — 74176 CT ABD & PELVIS W/O CONTRAST: CPT | Mod: 26,MA

## 2022-05-25 PROCEDURE — 85025 COMPLETE CBC W/AUTO DIFF WBC: CPT

## 2022-05-25 PROCEDURE — 76856 US EXAM PELVIC COMPLETE: CPT

## 2022-05-25 PROCEDURE — 84702 CHORIONIC GONADOTROPIN TEST: CPT

## 2022-05-25 PROCEDURE — 96375 TX/PRO/DX INJ NEW DRUG ADDON: CPT

## 2022-05-25 PROCEDURE — 96376 TX/PRO/DX INJ SAME DRUG ADON: CPT

## 2022-05-25 PROCEDURE — 96374 THER/PROPH/DIAG INJ IV PUSH: CPT

## 2022-05-25 PROCEDURE — 96361 HYDRATE IV INFUSION ADD-ON: CPT

## 2022-05-25 PROCEDURE — 81001 URINALYSIS AUTO W/SCOPE: CPT

## 2022-05-25 PROCEDURE — 36415 COLL VENOUS BLD VENIPUNCTURE: CPT

## 2022-05-25 PROCEDURE — 87086 URINE CULTURE/COLONY COUNT: CPT

## 2022-05-25 PROCEDURE — 76830 TRANSVAGINAL US NON-OB: CPT

## 2022-05-25 PROCEDURE — 85730 THROMBOPLASTIN TIME PARTIAL: CPT

## 2022-05-25 PROCEDURE — 85610 PROTHROMBIN TIME: CPT

## 2022-05-25 PROCEDURE — 99284 EMERGENCY DEPT VISIT MOD MDM: CPT | Mod: 25

## 2022-05-25 RX ORDER — KETOROLAC TROMETHAMINE 30 MG/ML
30 SYRINGE (ML) INJECTION ONCE
Refills: 0 | Status: DISCONTINUED | OUTPATIENT
Start: 2022-05-25 | End: 2022-05-25

## 2022-05-25 RX ORDER — ONDANSETRON 8 MG/1
4 TABLET, FILM COATED ORAL ONCE
Refills: 0 | Status: COMPLETED | OUTPATIENT
Start: 2022-05-25 | End: 2022-05-25

## 2022-05-25 RX ORDER — MORPHINE SULFATE 50 MG/1
4 CAPSULE, EXTENDED RELEASE ORAL ONCE
Refills: 0 | Status: DISCONTINUED | OUTPATIENT
Start: 2022-05-25 | End: 2022-05-25

## 2022-05-25 RX ADMIN — ONDANSETRON 4 MILLIGRAM(S): 8 TABLET, FILM COATED ORAL at 01:50

## 2022-05-25 RX ADMIN — Medication 30 MILLIGRAM(S): at 01:50

## 2022-05-25 RX ADMIN — MORPHINE SULFATE 4 MILLIGRAM(S): 50 CAPSULE, EXTENDED RELEASE ORAL at 03:32

## 2022-05-26 LAB
CULTURE RESULTS: SIGNIFICANT CHANGE UP
SPECIMEN SOURCE: SIGNIFICANT CHANGE UP

## 2022-08-15 ENCOUNTER — EMERGENCY (EMERGENCY)
Facility: HOSPITAL | Age: 37
LOS: 1 days | Discharge: HOME | End: 2022-08-15
Admitting: EMERGENCY MEDICINE

## 2022-08-15 DIAGNOSIS — R10.2 PELVIC AND PERINEAL PAIN: ICD-10-CM

## 2022-08-15 DIAGNOSIS — Z90.721 ACQUIRED ABSENCE OF OVARIES, UNILATERAL: Chronic | ICD-10-CM

## 2022-08-15 DIAGNOSIS — E11.9 TYPE 2 DIABETES MELLITUS WITHOUT COMPLICATIONS: ICD-10-CM

## 2022-08-15 PROCEDURE — G1004: CPT

## 2022-08-15 PROCEDURE — 76830 TRANSVAGINAL US NON-OB: CPT | Mod: 26

## 2022-08-15 PROCEDURE — 76856 US EXAM PELVIC COMPLETE: CPT | Mod: 26

## 2022-08-15 PROCEDURE — 74177 CT ABD & PELVIS W/CONTRAST: CPT | Mod: 26,ME

## 2022-08-15 PROCEDURE — 99285 EMERGENCY DEPT VISIT HI MDM: CPT

## 2022-11-01 ENCOUNTER — EMERGENCY (EMERGENCY)
Facility: HOSPITAL | Age: 37
LOS: 1 days | Discharge: HOME | End: 2022-11-01
Attending: STUDENT IN AN ORGANIZED HEALTH CARE EDUCATION/TRAINING PROGRAM | Admitting: STUDENT IN AN ORGANIZED HEALTH CARE EDUCATION/TRAINING PROGRAM
Payer: MEDICARE

## 2022-11-01 VITALS
HEART RATE: 74 BPM | SYSTOLIC BLOOD PRESSURE: 110 MMHG | WEIGHT: 190.04 LBS | DIASTOLIC BLOOD PRESSURE: 62 MMHG | OXYGEN SATURATION: 100 % | TEMPERATURE: 98 F | RESPIRATION RATE: 18 BRPM

## 2022-11-01 DIAGNOSIS — Z90.721 ACQUIRED ABSENCE OF OVARIES, UNILATERAL: Chronic | ICD-10-CM

## 2022-11-01 LAB
ALBUMIN SERPL ELPH-MCNC: 3.7 G/DL — SIGNIFICANT CHANGE UP (ref 3.3–5)
ALP SERPL-CCNC: 37 U/L — LOW (ref 40–120)
ALT FLD-CCNC: 16 U/L — SIGNIFICANT CHANGE UP (ref 12–78)
ANION GAP SERPL CALC-SCNC: 6 MMOL/L — SIGNIFICANT CHANGE UP (ref 5–17)
APPEARANCE UR: ABNORMAL
AST SERPL-CCNC: 10 U/L — LOW (ref 15–37)
BASOPHILS # BLD AUTO: 0.08 K/UL — SIGNIFICANT CHANGE UP (ref 0–0.2)
BASOPHILS NFR BLD AUTO: 1.1 % — SIGNIFICANT CHANGE UP (ref 0–2)
BILIRUB SERPL-MCNC: 0.3 MG/DL — SIGNIFICANT CHANGE UP (ref 0.2–1.2)
BILIRUB UR-MCNC: NEGATIVE — SIGNIFICANT CHANGE UP
BUN SERPL-MCNC: 9 MG/DL — SIGNIFICANT CHANGE UP (ref 7–23)
CALCIUM SERPL-MCNC: 8.7 MG/DL — SIGNIFICANT CHANGE UP (ref 8.5–10.1)
CHLORIDE SERPL-SCNC: 102 MMOL/L — SIGNIFICANT CHANGE UP (ref 96–108)
CO2 SERPL-SCNC: 32 MMOL/L — HIGH (ref 22–31)
COLOR SPEC: YELLOW — SIGNIFICANT CHANGE UP
CREAT SERPL-MCNC: 0.92 MG/DL — SIGNIFICANT CHANGE UP (ref 0.5–1.3)
DIFF PNL FLD: ABNORMAL
EGFR: 82 ML/MIN/1.73M2 — SIGNIFICANT CHANGE UP
EOSINOPHIL # BLD AUTO: 0.26 K/UL — SIGNIFICANT CHANGE UP (ref 0–0.5)
EOSINOPHIL NFR BLD AUTO: 3.4 % — SIGNIFICANT CHANGE UP (ref 0–6)
GLUCOSE SERPL-MCNC: 97 MG/DL — SIGNIFICANT CHANGE UP (ref 70–99)
GLUCOSE UR QL: NEGATIVE — SIGNIFICANT CHANGE UP
HCG SERPL-ACNC: <1 MIU/ML — SIGNIFICANT CHANGE UP
HCT VFR BLD CALC: 39.2 % — SIGNIFICANT CHANGE UP (ref 34.5–45)
HGB BLD-MCNC: 13.1 G/DL — SIGNIFICANT CHANGE UP (ref 11.5–15.5)
IMM GRANULOCYTES NFR BLD AUTO: 0.3 % — SIGNIFICANT CHANGE UP (ref 0–0.9)
KETONES UR-MCNC: NEGATIVE — SIGNIFICANT CHANGE UP
LEUKOCYTE ESTERASE UR-ACNC: NEGATIVE — SIGNIFICANT CHANGE UP
LYMPHOCYTES # BLD AUTO: 1.67 K/UL — SIGNIFICANT CHANGE UP (ref 1–3.3)
LYMPHOCYTES # BLD AUTO: 22 % — SIGNIFICANT CHANGE UP (ref 13–44)
MCHC RBC-ENTMCNC: 29.9 PG — SIGNIFICANT CHANGE UP (ref 27–34)
MCHC RBC-ENTMCNC: 33.4 GM/DL — SIGNIFICANT CHANGE UP (ref 32–36)
MCV RBC AUTO: 89.5 FL — SIGNIFICANT CHANGE UP (ref 80–100)
MONOCYTES # BLD AUTO: 0.69 K/UL — SIGNIFICANT CHANGE UP (ref 0–0.9)
MONOCYTES NFR BLD AUTO: 9.1 % — SIGNIFICANT CHANGE UP (ref 2–14)
NEUTROPHILS # BLD AUTO: 4.87 K/UL — SIGNIFICANT CHANGE UP (ref 1.8–7.4)
NEUTROPHILS NFR BLD AUTO: 64.1 % — SIGNIFICANT CHANGE UP (ref 43–77)
NITRITE UR-MCNC: NEGATIVE — SIGNIFICANT CHANGE UP
NRBC # BLD: 0 /100 WBCS — SIGNIFICANT CHANGE UP (ref 0–0)
PH UR: 5 — SIGNIFICANT CHANGE UP (ref 5–8)
PLATELET # BLD AUTO: 267 K/UL — SIGNIFICANT CHANGE UP (ref 150–400)
POTASSIUM SERPL-MCNC: 4.1 MMOL/L — SIGNIFICANT CHANGE UP (ref 3.5–5.3)
POTASSIUM SERPL-SCNC: 4.1 MMOL/L — SIGNIFICANT CHANGE UP (ref 3.5–5.3)
PROT SERPL-MCNC: 7.2 G/DL — SIGNIFICANT CHANGE UP (ref 6–8.3)
PROT UR-MCNC: 15
RBC # BLD: 4.38 M/UL — SIGNIFICANT CHANGE UP (ref 3.8–5.2)
RBC # FLD: 12.3 % — SIGNIFICANT CHANGE UP (ref 10.3–14.5)
SODIUM SERPL-SCNC: 140 MMOL/L — SIGNIFICANT CHANGE UP (ref 135–145)
SP GR SPEC: 1.02 — SIGNIFICANT CHANGE UP (ref 1.01–1.02)
UROBILINOGEN FLD QL: NEGATIVE — SIGNIFICANT CHANGE UP
WBC # BLD: 7.59 K/UL — SIGNIFICANT CHANGE UP (ref 3.8–10.5)
WBC # FLD AUTO: 7.59 K/UL — SIGNIFICANT CHANGE UP (ref 3.8–10.5)

## 2022-11-01 PROCEDURE — 99285 EMERGENCY DEPT VISIT HI MDM: CPT

## 2022-11-01 RX ORDER — KETOROLAC TROMETHAMINE 30 MG/ML
30 SYRINGE (ML) INJECTION ONCE
Refills: 0 | Status: DISCONTINUED | OUTPATIENT
Start: 2022-11-01 | End: 2022-11-01

## 2022-11-01 RX ORDER — SODIUM CHLORIDE 9 MG/ML
1000 INJECTION INTRAMUSCULAR; INTRAVENOUS; SUBCUTANEOUS ONCE
Refills: 0 | Status: COMPLETED | OUTPATIENT
Start: 2022-11-01 | End: 2022-11-01

## 2022-11-01 RX ORDER — MORPHINE SULFATE 50 MG/1
4 CAPSULE, EXTENDED RELEASE ORAL ONCE
Refills: 0 | Status: DISCONTINUED | OUTPATIENT
Start: 2022-11-01 | End: 2022-11-01

## 2022-11-01 RX ORDER — MORPHINE SULFATE 50 MG/1
2 CAPSULE, EXTENDED RELEASE ORAL ONCE
Refills: 0 | Status: DISCONTINUED | OUTPATIENT
Start: 2022-11-01 | End: 2022-11-01

## 2022-11-01 RX ADMIN — MORPHINE SULFATE 4 MILLIGRAM(S): 50 CAPSULE, EXTENDED RELEASE ORAL at 23:16

## 2022-11-01 RX ADMIN — MORPHINE SULFATE 2 MILLIGRAM(S): 50 CAPSULE, EXTENDED RELEASE ORAL at 21:45

## 2022-11-01 RX ADMIN — MORPHINE SULFATE 4 MILLIGRAM(S): 50 CAPSULE, EXTENDED RELEASE ORAL at 22:59

## 2022-11-01 RX ADMIN — SODIUM CHLORIDE 1000 MILLILITER(S): 9 INJECTION INTRAMUSCULAR; INTRAVENOUS; SUBCUTANEOUS at 21:45

## 2022-11-01 RX ADMIN — MORPHINE SULFATE 2 MILLIGRAM(S): 50 CAPSULE, EXTENDED RELEASE ORAL at 22:28

## 2022-11-01 NOTE — ED ADULT TRIAGE NOTE - HISTORY OF COVID-19 VACCINATION
Yes Provider Procedure Text (A): After obtaining clear surgical margins the defect was repaired by another provider.

## 2022-11-01 NOTE — ED PROVIDER NOTE - NSFOLLOWUPINSTRUCTIONS_ED_ALL_ED_FT
Please follow up with your Ob/Gyn as per your appointment this Friday.  Return to the ER for persistent pain, fever, vomiting, burning urination, or any other concerns.       Ovarian Cyst       An ovarian cyst is a fluid-filled sac on an ovary. Most of these cysts go away on their own and are not cancer. Some cysts need treatment.      What are the causes?    •Ovarian hyperstimulation syndrome. Some medicines may lead to this problem.      •Polycystic ovarian syndrome (PCOS). Problems with body chemicals (hormones) can lead to this condition.      •The normal menstrual cycle.        What increases the risk?    •Being overweight or very overweight.      •Taking medicines to increase your chance of getting pregnant.      •Using some types of birth control.      •Smoking.        What are the signs or symptoms?    Many ovarian cysts do not cause symptoms. If you get symptoms, you may have:  •Pain or pressure in the area between the hip bones.      •Pain in the lower belly.      •Pain during sex.      •Swelling in the lower belly.      •Periods that are not regular.      •Pain with periods.        How is this treated?    Many ovarian cysts go away on their own without treatment. If you need treatment, it may include:  •Medicines for pain.      •Fluid taken out of the cyst.      •The cyst being taken out.      •Birth control pills or other medicines.      •Surgery to remove the ovary.        Follow these instructions at home:    •Take over-the-counter and prescription medicines only as told by your doctor.      •Ask your doctor if you should avoid driving or using machines while you are taking your medicine.      •Get exams and Pap tests as told by your doctor.      •Return to your normal activities when your doctor says that it is safe.      • Do not smoke or use any products that contain nicotine or tobacco. If you need help quitting, ask your doctor.      •Keep all follow-up visits.        Contact a doctor if:  •Your periods:  •Are late.      •Are not regular.      •Stop.      •Are painful.        •You have pain in the area between your hip bones, and the pain does not go away.      •You feel pressure on your bladder.      •You have trouble peeing.      •You feel full, or your belly hurts, swells, or bloats.      •You gain or lose weight without trying, or you are less hungry than normal.      •You feel pain and pressure in your back.      •You feel pain and pressure in the area between your hip bones.      •You think you may be pregnant.        Get help right away if:    •You have pain in your belly that is very bad or gets worse.      •You have pain in the area between your hip bones, and the pain is very bad or gets worse.      •You cannot eat or drink without vomiting.      •You get a fever or chills all of a sudden.      •Your period is a lot heavier than usual.        Summary    •An ovarian cyst is a fluid-filled sac on an ovary.      •Some cysts may cause problems and need treatment.      •Most of these cysts go away on their own.      This information is not intended to replace advice given to you by your health care provider. Make sure you discuss any questions you have with your health care provider.

## 2022-11-01 NOTE — ED PROVIDER NOTE - CARE PROVIDER_API CALL
LONA PETERSON  Internal Medicine  [address] [place] [address]  Phone: [phone]  Fax: [phone]  Follow Up Time:     Ion Pederson  Obstetrics & Gynecology  [address] [place] [address]  Phone: [phone]  Fax: [phone]  Follow Up Time:

## 2022-11-01 NOTE — ED PROVIDER NOTE - CLINICAL SUMMARY MEDICAL DECISION MAKING FREE TEXT BOX
36 year old female with intermittent sharp LLQ and suprapubic pain x 4 days.  H/o ovarian torsion.  Has US at another ED last week showing left ovarian cyst.  Check labs, UA, UCx, US pelvis r/o torsion, hydrate, analgesia, consult Gyn as needed

## 2022-11-01 NOTE — ED ADULT NURSE NOTE - OBJECTIVE STATEMENT
Patient received from home complaining of left flank pain radiating to back and lower mid abdominal area x2 days with nausea and vomiting. Patient states the pain is similar to right ovary torsion pain she had in the past and states LMP was oct 28th. Patient is AOx4, safety precautions in place, awaiting evaluation.

## 2022-11-01 NOTE — ED PROVIDER NOTE - PATIENT PORTAL LINK FT
You can access the FollowMyHealth Patient Portal offered by [facility] by registering at the following website: [email or web address]. By joining [employer]’s FollowMyHealth portal, you will also be able to view your health information using other applications (apps) compatible with our system.

## 2022-11-01 NOTE — ED ADULT NURSE NOTE - CHIEF COMPLAINT QUOTE
36yr old female arrived a&ox4 to ED from home c/o sharp LLQ pain radiating to back since 10/28, symptoms worsening today n/v at times, abd not noted to be distended, pt notes last BM x2 days ago. Denies chest pain or sob at th is time. ibuprofen 800 adm 8hrs ago.

## 2022-11-01 NOTE — ED ADULT NURSE NOTE - NSIMPLEMENTINTERV_GEN_ALL_ED
Implemented All Universal Safety Interventions:  Luke to call system. Call bell, personal items and telephone within reach. Instruct patient to call for assistance. Room bathroom lighting operational. Non-slip footwear when patient is off stretcher. Physically safe environment: no spills, clutter or unnecessary equipment. Stretcher in lowest position, wheels locked, appropriate side rails in place.

## 2022-11-01 NOTE — ED ADULT TRIAGE NOTE - CHIEF COMPLAINT QUOTE
36yr old female arrived a&ox4 to ED from home c/o sharp LLQ pain radiating to back since 10/28, symptoms worsening today n/v at times, abd not noted to be distended, pt notes last BM x2 days ago. Denies chest pain or sob at th is time. 36yr old female arrived a&ox4 to ED from home c/o sharp LLQ pain radiating to back since 10/28, symptoms worsening today n/v at times, abd not noted to be distended, pt notes last BM x2 days ago. Denies chest pain or sob at th is time. ibuprofen 800 adm 8hrs ago.

## 2022-11-01 NOTE — ED PROVIDER NOTE - OBJECTIVE STATEMENT
pt is a 35yo female with pmhx of ovarian torsion presents with left suprapubic pain x days. pt reports left sided lower abd pain with n/v which is intermittent. pt went to [facility]? unsure what day had us which showed ovarian cyst. pt reports at this time pain improved. pt reports today she started to have similar pain in left lower abd with n/v so she came to ed for eval. pt has been taking ibuprofen for symptoms. pt denies fever, cp, sob, dysuria, vaginal bleeding.     obgy: [facility] in [place]

## 2022-11-01 NOTE — ED PROVIDER NOTE - PROGRESS NOTE DETAILS
juan rodriguez attending to follow up results as my shift has ended Results of work up d/w patient and copies of all reports given.  Patient has appointment with Ob/Gyn this Friday.   to  patient.

## 2022-11-01 NOTE — ED PROVIDER NOTE - ATTENDING APP SHARED VISIT CONTRIBUTION OF CARE
36 year old female with a history of ovarian torsion and right oophorectomy p/w LLQ pain.  Patient reports intermittent sharp LLQ and suprapubic pain radiating to her back for 4 days. It is associated with nausea and vomiting. She was seen at [facility] last week, had an ultrasound which showed left ovarian cyst.  Patient has been treating her pain with Motrin, last taken 8 hours ago.  Feels the pain is persistent.  She was dx with right ovarian torsion in 2020 and was told last year that she may have intermittent torsion on the left side.  Also has a h/o kidney stones. Scheduled to see her Gyn later this week.  Denies hematuria, fever, dysuria, vaginal bleeding.  Gyn Dr. Pederson    AO x 3 in NAD  RRR, S1S2  Lungs CTA b/l  Abd soft, non-distended.  LLQ and suprapubic tenderness with no rebound, no guarding.  +BS, no CVA tenderness, no pulsatile mass, no bladder distention, no flank pain  No focal neuro deficits

## 2022-11-02 VITALS
HEART RATE: 82 BPM | RESPIRATION RATE: 16 BRPM | TEMPERATURE: 98 F | OXYGEN SATURATION: 100 % | SYSTOLIC BLOOD PRESSURE: 116 MMHG | DIASTOLIC BLOOD PRESSURE: 68 MMHG

## 2022-11-02 LAB — SARS-COV-2 RNA SPEC QL NAA+PROBE: SIGNIFICANT CHANGE UP

## 2022-11-02 PROCEDURE — 84702 CHORIONIC GONADOTROPIN TEST: CPT

## 2022-11-02 PROCEDURE — 85025 COMPLETE CBC W/AUTO DIFF WBC: CPT

## 2022-11-02 PROCEDURE — 76830 TRANSVAGINAL US NON-OB: CPT | Mod: 26

## 2022-11-02 PROCEDURE — 76830 TRANSVAGINAL US NON-OB: CPT

## 2022-11-02 PROCEDURE — 81001 URINALYSIS AUTO W/SCOPE: CPT

## 2022-11-02 PROCEDURE — 36415 COLL VENOUS BLD VENIPUNCTURE: CPT

## 2022-11-02 PROCEDURE — 99284 EMERGENCY DEPT VISIT MOD MDM: CPT | Mod: 25

## 2022-11-02 PROCEDURE — U0003: CPT

## 2022-11-02 PROCEDURE — 80053 COMPREHEN METABOLIC PANEL: CPT

## 2022-11-02 PROCEDURE — U0005: CPT

## 2022-11-02 PROCEDURE — 96374 THER/PROPH/DIAG INJ IV PUSH: CPT

## 2022-11-02 PROCEDURE — 96376 TX/PRO/DX INJ SAME DRUG ADON: CPT

## 2022-11-02 RX ORDER — TRAMADOL HYDROCHLORIDE 50 MG/1
50 TABLET ORAL ONCE
Refills: 0 | Status: DISCONTINUED | OUTPATIENT
Start: 2022-11-02 | End: 2022-11-02

## 2022-11-02 RX ORDER — MORPHINE SULFATE 50 MG/1
2 CAPSULE, EXTENDED RELEASE ORAL ONCE
Refills: 0 | Status: DISCONTINUED | OUTPATIENT
Start: 2022-11-02 | End: 2022-11-02

## 2022-11-02 RX ORDER — CEFUROXIME AXETIL 250 MG
1 TABLET ORAL
Qty: 10 | Refills: 0
Start: 2022-11-02 | End: 2022-11-06

## 2022-11-02 RX ORDER — ONDANSETRON 8 MG/1
1 TABLET, FILM COATED ORAL
Qty: 9 | Refills: 0
Start: 2022-11-02 | End: 2022-11-04

## 2022-11-02 RX ADMIN — TRAMADOL HYDROCHLORIDE 50 MILLIGRAM(S): 50 TABLET ORAL at 02:57

## 2022-11-02 RX ADMIN — MORPHINE SULFATE 2 MILLIGRAM(S): 50 CAPSULE, EXTENDED RELEASE ORAL at 01:13

## 2022-11-02 RX ADMIN — MORPHINE SULFATE 2 MILLIGRAM(S): 50 CAPSULE, EXTENDED RELEASE ORAL at 00:53

## 2023-02-21 ENCOUNTER — TRANSCRIPTION ENCOUNTER (OUTPATIENT)
Age: 38
End: 2023-02-21

## 2023-02-21 ENCOUNTER — APPOINTMENT (OUTPATIENT)
Dept: OBGYN | Facility: CLINIC | Age: 38
End: 2023-02-21
Payer: MEDICARE

## 2023-02-21 VITALS
SYSTOLIC BLOOD PRESSURE: 137 MMHG | WEIGHT: 174 LBS | BODY MASS INDEX: 27.31 KG/M2 | HEIGHT: 67 IN | DIASTOLIC BLOOD PRESSURE: 83 MMHG

## 2023-02-21 DIAGNOSIS — Z00.00 ENCOUNTER FOR GENERAL ADULT MEDICAL EXAMINATION W/OUT ABNORMAL FINDINGS: ICD-10-CM

## 2023-02-21 PROBLEM — Z78.9 OTHER SPECIFIED HEALTH STATUS: Chronic | Status: ACTIVE | Noted: 2022-11-01

## 2023-02-21 PROCEDURE — 99212 OFFICE O/P EST SF 10 MIN: CPT

## 2023-02-22 ENCOUNTER — NON-APPOINTMENT (OUTPATIENT)
Age: 38
End: 2023-02-22

## 2023-02-24 ENCOUNTER — APPOINTMENT (OUTPATIENT)
Dept: OBGYN | Facility: CLINIC | Age: 38
End: 2023-02-24

## 2023-05-17 ENCOUNTER — EMERGENCY (EMERGENCY)
Facility: HOSPITAL | Age: 38
LOS: 1 days | Discharge: HOME | End: 2023-05-17
Attending: EMERGENCY MEDICINE
Payer: MEDICARE

## 2023-05-17 VITALS
DIASTOLIC BLOOD PRESSURE: 86 MMHG | OXYGEN SATURATION: 100 % | TEMPERATURE: 98 F | RESPIRATION RATE: 18 BRPM | HEART RATE: 88 BPM | SYSTOLIC BLOOD PRESSURE: 118 MMHG

## 2023-05-17 VITALS
WEIGHT: 169.98 LBS | HEART RATE: 75 BPM | HEIGHT: 68 IN | TEMPERATURE: 98 F | SYSTOLIC BLOOD PRESSURE: 127 MMHG | OXYGEN SATURATION: 98 % | DIASTOLIC BLOOD PRESSURE: 83 MMHG | RESPIRATION RATE: 20 BRPM

## 2023-05-17 DIAGNOSIS — Z90.721 ACQUIRED ABSENCE OF OVARIES, UNILATERAL: Chronic | ICD-10-CM

## 2023-05-17 LAB
ALBUMIN SERPL ELPH-MCNC: 3.9 G/DL — SIGNIFICANT CHANGE UP (ref 3.3–5.2)
ALP SERPL-CCNC: 37 U/L — LOW (ref 40–120)
ALT FLD-CCNC: 45 U/L — HIGH
ANION GAP SERPL CALC-SCNC: 11 MMOL/L — SIGNIFICANT CHANGE UP (ref 5–17)
APTT BLD: 30.2 SEC — SIGNIFICANT CHANGE UP (ref 27.5–35.5)
AST SERPL-CCNC: 30 U/L — SIGNIFICANT CHANGE UP
BASOPHILS # BLD AUTO: 0.07 K/UL — SIGNIFICANT CHANGE UP (ref 0–0.2)
BASOPHILS NFR BLD AUTO: 1.2 % — SIGNIFICANT CHANGE UP (ref 0–2)
BILIRUB SERPL-MCNC: 0.3 MG/DL — LOW (ref 0.4–2)
BLD GP AB SCN SERPL QL: SIGNIFICANT CHANGE UP
BUN SERPL-MCNC: 7.3 MG/DL — LOW (ref 8–20)
CALCIUM SERPL-MCNC: 8.4 MG/DL — SIGNIFICANT CHANGE UP (ref 8.4–10.5)
CHLORIDE SERPL-SCNC: 104 MMOL/L — SIGNIFICANT CHANGE UP (ref 96–108)
CO2 SERPL-SCNC: 26 MMOL/L — SIGNIFICANT CHANGE UP (ref 22–29)
CREAT SERPL-MCNC: 0.89 MG/DL — SIGNIFICANT CHANGE UP (ref 0.5–1.3)
EGFR: 85 ML/MIN/1.73M2 — SIGNIFICANT CHANGE UP
EOSINOPHIL # BLD AUTO: 0.43 K/UL — SIGNIFICANT CHANGE UP (ref 0–0.5)
EOSINOPHIL NFR BLD AUTO: 7.5 % — HIGH (ref 0–6)
GLUCOSE SERPL-MCNC: 83 MG/DL — SIGNIFICANT CHANGE UP (ref 70–99)
HCG UR QL: NEGATIVE — SIGNIFICANT CHANGE UP
HCT VFR BLD CALC: 36.6 % — SIGNIFICANT CHANGE UP (ref 34.5–45)
HGB BLD-MCNC: 11.9 G/DL — SIGNIFICANT CHANGE UP (ref 11.5–15.5)
IMM GRANULOCYTES NFR BLD AUTO: 0.2 % — SIGNIFICANT CHANGE UP (ref 0–0.9)
INR BLD: 0.95 RATIO — SIGNIFICANT CHANGE UP (ref 0.88–1.16)
LYMPHOCYTES # BLD AUTO: 1.81 K/UL — SIGNIFICANT CHANGE UP (ref 1–3.3)
LYMPHOCYTES # BLD AUTO: 31.8 % — SIGNIFICANT CHANGE UP (ref 13–44)
MCHC RBC-ENTMCNC: 28.7 PG — SIGNIFICANT CHANGE UP (ref 27–34)
MCHC RBC-ENTMCNC: 32.5 GM/DL — SIGNIFICANT CHANGE UP (ref 32–36)
MCV RBC AUTO: 88.4 FL — SIGNIFICANT CHANGE UP (ref 80–100)
MONOCYTES # BLD AUTO: 0.57 K/UL — SIGNIFICANT CHANGE UP (ref 0–0.9)
MONOCYTES NFR BLD AUTO: 10 % — SIGNIFICANT CHANGE UP (ref 2–14)
NEUTROPHILS # BLD AUTO: 2.81 K/UL — SIGNIFICANT CHANGE UP (ref 1.8–7.4)
NEUTROPHILS NFR BLD AUTO: 49.3 % — SIGNIFICANT CHANGE UP (ref 43–77)
PLATELET # BLD AUTO: 230 K/UL — SIGNIFICANT CHANGE UP (ref 150–400)
POTASSIUM SERPL-MCNC: 3.9 MMOL/L — SIGNIFICANT CHANGE UP (ref 3.5–5.3)
POTASSIUM SERPL-SCNC: 3.9 MMOL/L — SIGNIFICANT CHANGE UP (ref 3.5–5.3)
PROT SERPL-MCNC: 6 G/DL — LOW (ref 6.6–8.7)
PROTHROM AB SERPL-ACNC: 11 SEC — SIGNIFICANT CHANGE UP (ref 10.5–13.4)
RBC # BLD: 4.14 M/UL — SIGNIFICANT CHANGE UP (ref 3.8–5.2)
RBC # FLD: 13.2 % — SIGNIFICANT CHANGE UP (ref 10.3–14.5)
SODIUM SERPL-SCNC: 141 MMOL/L — SIGNIFICANT CHANGE UP (ref 135–145)
WBC # BLD: 5.7 K/UL — SIGNIFICANT CHANGE UP (ref 3.8–10.5)
WBC # FLD AUTO: 5.7 K/UL — SIGNIFICANT CHANGE UP (ref 3.8–10.5)

## 2023-05-17 PROCEDURE — 76830 TRANSVAGINAL US NON-OB: CPT

## 2023-05-17 PROCEDURE — 96375 TX/PRO/DX INJ NEW DRUG ADDON: CPT

## 2023-05-17 PROCEDURE — 76856 US EXAM PELVIC COMPLETE: CPT

## 2023-05-17 PROCEDURE — 76856 US EXAM PELVIC COMPLETE: CPT | Mod: 26

## 2023-05-17 PROCEDURE — 36415 COLL VENOUS BLD VENIPUNCTURE: CPT

## 2023-05-17 PROCEDURE — 96374 THER/PROPH/DIAG INJ IV PUSH: CPT

## 2023-05-17 PROCEDURE — 96372 THER/PROPH/DIAG INJ SC/IM: CPT | Mod: XU

## 2023-05-17 PROCEDURE — 80053 COMPREHEN METABOLIC PANEL: CPT

## 2023-05-17 PROCEDURE — 99283 EMERGENCY DEPT VISIT LOW MDM: CPT

## 2023-05-17 PROCEDURE — 76830 TRANSVAGINAL US NON-OB: CPT | Mod: 26

## 2023-05-17 PROCEDURE — 99285 EMERGENCY DEPT VISIT HI MDM: CPT

## 2023-05-17 PROCEDURE — 86900 BLOOD TYPING SEROLOGIC ABO: CPT

## 2023-05-17 PROCEDURE — 85610 PROTHROMBIN TIME: CPT

## 2023-05-17 PROCEDURE — 81025 URINE PREGNANCY TEST: CPT

## 2023-05-17 PROCEDURE — 86901 BLOOD TYPING SEROLOGIC RH(D): CPT

## 2023-05-17 PROCEDURE — 86850 RBC ANTIBODY SCREEN: CPT

## 2023-05-17 PROCEDURE — 85730 THROMBOPLASTIN TIME PARTIAL: CPT

## 2023-05-17 PROCEDURE — 85025 COMPLETE CBC W/AUTO DIFF WBC: CPT

## 2023-05-17 PROCEDURE — 99284 EMERGENCY DEPT VISIT MOD MDM: CPT | Mod: 25

## 2023-05-17 RX ORDER — KETOROLAC TROMETHAMINE 30 MG/ML
30 SYRINGE (ML) INJECTION ONCE
Refills: 0 | Status: DISCONTINUED | OUTPATIENT
Start: 2023-05-17 | End: 2023-05-17

## 2023-05-17 RX ORDER — MORPHINE SULFATE 50 MG/1
4 CAPSULE, EXTENDED RELEASE ORAL ONCE
Refills: 0 | Status: DISCONTINUED | OUTPATIENT
Start: 2023-05-17 | End: 2023-05-17

## 2023-05-17 RX ADMIN — Medication 30 MILLIGRAM(S): at 22:09

## 2023-05-17 RX ADMIN — MORPHINE SULFATE 4 MILLIGRAM(S): 50 CAPSULE, EXTENDED RELEASE ORAL at 22:58

## 2023-05-17 RX ADMIN — MORPHINE SULFATE 4 MILLIGRAM(S): 50 CAPSULE, EXTENDED RELEASE ORAL at 21:04

## 2023-05-17 NOTE — ED STATDOCS - ATTENDING APP SHARED VISIT CONTRIBUTION OF CARE
I, Taya Rome, performed the initial face to face bedside interview with this patient regarding history of present illness, review of symptoms and relevant past medical, social and family history.  I completed an independent physical examination.  I was the initial provider who evaluated this patient. I have signed out the follow up of any pending tests (i.e. labs, radiological studies) to the ACP.  I have communicated the patient’s plan of care and disposition with the ACP.  The history, relevant review of systems, past medical and surgical history, medical decision making, and physical examination was documented by the scribe in my presence and I attest to the accuracy of the documentation.

## 2023-05-17 NOTE — ED ADULT NURSE NOTE - NSICDXPASTMEDICALHX_GEN_ALL_CORE_FT
Family.../Staff... Family.../Staff... Staff.../Family... Family.../Staff... PAST MEDICAL HISTORY:  No pertinent past medical history

## 2023-05-17 NOTE — CONSULT NOTE ADULT - ASSESSMENT
CATRACHO WONG is a 37y , LMP 23  who presented to the ED for intermittent left lower quadrant pain with nausea and vomiting.     -VSS  -hcg negative  -TVUS significant for 4.8cm cyst normal dopplers  -Clinical picture not consistent with ovarian torsion  -Recommend outpatient follow up to schedule diagnostic laparoscopy if pain persists  -Ibuprofen and tylenol for pain      D/w Dr. Villalobos CATRACHO WONG is a 37y , LMP 23  who presented to the ED for intermittent left lower quadrant pain with nausea and vomiting.     -VSS  -hcg negative  -TVUS significant for 4.8cm cyst normal dopplers  -Clinical picture not consistent with ovarian torsion  -Recommend outpatient follow up to schedule diagnostic laparoscopy if pain persists  -Ibuprofen and tylenol for pain  -No acute gyn management at this time      D/w Dr. Villalobos CATRACHO WONG is a 37y , LMP 23  who presented to the ED for intermittent left lower quadrant pain with nausea and vomiting.     -VSS  -hcg negative  -TVUS significant for 4.8cm cyst normal dopplers  -Clinical picture not consistent with ovarian torsion  -Recommend outpatient follow up to schedule diagnostic laparoscopy if pain persists  -Ibuprofen and tylenol for pain  -No acute gyn management at this time    D/w Dr. Villalobos    Addendum:    Subjective Hx, Physical Exam, Laboratory & Imaging results reviewed.  I agree with the Resident Physician's assessment and plan of care, as discussed above.  Call, follow up, and return to Hospital parameters reviewed at length.  She was given the opportunity to ask questions and all were addressed.  Discharge home    Patricio Villalobos, DO

## 2023-05-17 NOTE — ED ADULT NURSE NOTE - OBJECTIVE STATEMENT
assumed care of pt at 2100. pt reports LLQ abd pain since last night. denies nausea, vomiting or vaginal bleeding. pmh of ovarian torsion. pt currently endorsing in 10/10 at this time. rr even and unlabored. + abdominal distension.  anox4. pt educated on plan of care, pt able to successfully teach back plan of care to RN, RN will continue to reeducate pt during hospital stay.

## 2023-05-17 NOTE — ED STATDOCS - NSCAREINITIATED _GEN_ER
Pt did not come for PAT appointment. Pt called-she reports she forgot appointment and her son will need to bring her,and that he can not bring her today. Called and left message for pt's son-Abdirashid Castellanos- that appointment will be resceduled by surgeon's office. Surgeon's office notified: left message for [employer].
Taya Becerra(Attending)

## 2023-05-17 NOTE — CONSULT NOTE ADULT - SUBJECTIVE AND OBJECTIVE BOX
CATRACHO WONG is a 37y , LMP 23  who presented to the ED for intermittent left lower quadrant pain with nausea and vomiting. She was seen yesterday at [facility] and was sent home with zofran for nausea. She saw her obgyn at [facility] today and they referred her to the hospital for repeat ultrasound as they did not have that available at the office today. She reports the pain comes and goes, she has not vomited since yesterday. She notes she has a known history of 2 2cm cysts on the left ovary that she has been followed for; one of which ruptured recently. Denies fevers, chills, dysuria. GYN consulted for evaluation of possible ovarian torsion.      OB history:  NVSD x1  CSx2  SABx2  eTOPx1  Adoption x  GYN history: Long standing history of ovarian cysts. Partial right oophorectomy at age 9. Right oophorectomy for suspected torsion . Plans for surgery to  [place] back the [place] [facility]. IUD placed 1 month ago. Denies STD hx or abnormal pap smears  Past medical history: Type II diabetes  Past surgical history: neck surgery,   Medications: Mounjaro, oxycodone, IUD  Allergies: nkda    Physical Exam  T(C): 36.7 (23 @ 18:53), Max: 36.7 (23 @ 18:53)  HR: 75 (23 @ 18:53) (75 - 75)  BP: 127/83 (23 @ 18:53) (127/83 - 127/83)  RR: 20 (23 @ 18:53) (20 - 20)  SpO2: 98% (23 @ 18:53) (98% - 98%)    General: alert and oriented x3, no acute distress  Abdominal: Mild tenderness to palpation of the LLQ; able to sit down and lay flat and sit up without pain. No guarding, no rebound tenderness. No CVA tenderness        Labs:                        11.9   5.70  )-----------( 230      ( 17 May 2023 21:49 )             36.6           PT/INR - ( 17 May 2023 21:49 )   PT: 11.0 sec;   INR: 0.95 ratio         PTT - ( 17 May 2023 21:49 )  PTT:30.2 sec            Imaging:   FINDINGS:    Uterus: 8.8 cm x 5.6 cm x 6.8 cm. Within normal limits.  Endometrium: 5 mm. Within normal limits.    Reported history of right oophorectomy.    Left ovary: 4.6 cm x 3.3 cm x 3.8 cm. Reidentified septated cyst   measuring 4.8 x 3 x 3.7 cm containing internal septations. Normal   vascularity is present.    Fluid: None.    IMPRESSION:    No sonographic findings of left ovarian torsion at this time. Again   torsion-detorsion should be excluded on the clinical grounds.    Reidentified left ovarian septated cyst measuring up to 4.8 cm. Patient   remains at increased risk for ovarian torsion. GYN Consultation and   nonemergent follow-up imaging with MRI is suggested.

## 2023-05-17 NOTE — ED STATDOCS - OBJECTIVE STATEMENT
36 y/o female with PMHx of DM presents to the ED c/o LLQ abdominal pain with vomiting since yesterday with Hx of ovarian torsion and known cyst on left ovary. Hx of ovarian torsion which resulted in pt having to lose her right ovary. Pt is on oxycodone twice a day without relief after going to the ED yesterday. Pt was discharged as imaging did not show the ovary was torsed at that very moment. Pain is worse today.

## 2023-05-17 NOTE — ED STATDOCS - PATIENT PORTAL LINK FT
You can access the FollowMyHealth Patient Portal offered by [facility] by registering at the following website: [email or web address]. By joining [employer]’s FollowMyHealth portal, you will also be able to view your health information using other applications (apps) compatible with our system.

## 2023-05-17 NOTE — ED STATDOCS - CLINICAL SUMMARY MEDICAL DECISION MAKING FREE TEXT BOX
36 y/o female with PMHx of DM presents to the ED c/o LLQ abdominal pain with vomiting since yesterday with Hx of ovarian torsion and known cyst on left ovary. Mild tenderness in LLQ. Will obtain sonogram, treat pain and re-assess. 38 y/o female with PMHx of DM presents to the ED c/o LLQ abdominal pain with vomiting since yesterday with Hx of ovarian torsion and known cyst on left ovary. Mild tenderness in LLQ. Will obtain sonogram, treat pain and re-assess.    Nanette SORENSON : US with 4.8cm cyst. GYn consulted to exclude torsion/detorsion - recommended dc home w outpt f/u and return precautions. pt feeling better after meds, comfortable w plan of care, has gyn f/u in place and in process of scheduling surgery

## 2023-05-17 NOTE — ED ADULT NURSE NOTE - NSFALLUNIVINTERV_ED_ALL_ED
Bed/Stretcher in lowest position, wheels locked, appropriate side rails in place/Call bell, personal items and telephone in reach/Instruct patient to call for assistance before getting out of bed/chair/stretcher/Non-slip footwear applied when patient is off stretcher/[place] to call system/Physically safe environment - no spills, clutter or unnecessary equipment/Purposeful proactive rounding/Room/bathroom lighting operational, light cord in reach

## 2024-01-04 NOTE — ED ADULT TRIAGE NOTE - MODE OF ARRIVAL
If you have recurrence of black stools or passage of blood clots from the rectum, have recurrent blood in your sputum, chest pain or trouble breathing, return to the emergency department immediately to be reevaluated.     You left today before your work-up was completed and this can mean that sometimes a diagnosis is missed and that you are not receiving appropriate treatment. Without doing a complete exam and evaluation, we cannot rule out all life threatening conditions. You MUST return to the ER if you have any worsening of symptoms.     ADDITIONAL FOLLOW-UP INSTRUCTIONS:     Your evaluation today is reassuring and your emergency care team has decided it is safe for you to be discharged.    However, many potentially serious conditions cannot be detected with standard emergency testing.  It is very important that you follow up with your doctor as directed for further examination and possible additional testing or treatment.        Follow up care is nearly always required after a visit to the Emergency Department. It is your responsibility to call your doctor or call the number(s) provided for a follow up appointment. Your diagnosis today is a provisional one based on information available to the Emergency Physician today. The diagnosis may change as more information becomes available to your personal physician or other healthcare providers. If you develop any new, worsening, or concerning symptoms, or you feel you are not improving, please return to the Emergency Department for re-evaluation.    Do you have prescriptions today?    Prescriptions can be filled at the pharmacy of your choice. Keep in mind that most prescriptions should be filled immediately (or first thing tomorrow morning if it is after business hours).  This is especially important for antibiotics.      There are a few medications which are FREE at [facility] (amoxicillin, cephalexin (keflex), bactrim, ampicillin, penicillin, and erythromycin, generic  Lipitor, metformin and prenatal vitamins). Also, [facility], and [facility] pharmacies provide a number of medications for $4 if you do not have insurance. You may call the pharmacy or check online to see if your medication is covered.    Did you have X-rays taken today?    Any X-rays taken during your visit have been reviewed by the emergency physician today.  You should follow up with your primary care provider for the final results to be sure no additional treatment is needed.    Do you need a personal doctor or primary care clinic?    If you do not have a primary care doctor please inform your ER Doctor and ask for a referral. You may also search for a doctor or clinic online, or call your insurance provider to ask for a provider referral. You can also call [phone] to make an appointment with an [facility] provider.       Extra information about your diagnosis is included if available. It is our honor to take care of you and your family.      ADDITIONAL FOLLOW-UP INSTRUCTIONS:     Your evaluation today is reassuring and your emergency care team has decided it is safe for you to be discharged.    However, many potentially serious conditions cannot be detected with standard emergency testing.  It is very important that you follow up with your doctor as directed for further examination and possible additional testing or treatment.        Follow up care is nearly always required after a visit to the Emergency Department. It is your responsibility to call your doctor or call the number(s) provided for a follow up appointment. Your diagnosis today is a provisional one based on information available to the Emergency Physician today. The diagnosis may change as more information becomes available to your personal physician or other healthcare providers. If you develop any new, worsening, or concerning symptoms, or you feel you are not improving, please return to the Emergency Department for re-evaluation.    Do  you have prescriptions today?    Prescriptions can be filled at the pharmacy of your choice. Keep in mind that most prescriptions should be filled immediately (or first thing tomorrow morning if it is after business hours).  This is especially important for antibiotics.      There are a few medications which are FREE at [facility] (amoxicillin, cephalexin (keflex), bactrim, ampicillin, penicillin, and erythromycin, generic Lipitor, metformin and prenatal vitamins). Also, [facility], and [facility] pharmacies provide a number of medications for $4 if you do not have insurance. You may call the pharmacy or check online to see if your medication is covered.    Did you have X-rays taken today?    Any X-rays taken during your visit have been reviewed by the emergency physician today.  You should follow up with your primary care provider for the final results to be sure no additional treatment is needed.    Do you need a personal doctor or primary care clinic?    If you do not have a primary care doctor please inform your ER Doctor and ask for a referral. You may also search for a doctor or clinic online, or call your insurance provider to ask for a provider referral. You can also call [phone] to make an appointment with an [facility] provider.       Extra information about your diagnosis is included if available. It is our honor to take care of you and your family.     [employer]

## 2025-01-09 NOTE — CONSULT NOTE ADULT - SUBJECTIVE AND OBJECTIVE BOX
Patient Goals  Diabetes  Fasting B - 130 mg/dL  Postprandial BG: less than 180 mg/dL  A1c: less than 6.5%   Weight Loss  BMI <25  2.5% weight loss in one month   At least 10% weight loss in 6 months  Cholesterol  LDL-C <70  TG <150   Blood Pressure   /80      Keep up the great work!!    Plan:   INCREASE: Semaglutide 1 mg once weekly   Denies adrs  Further diabetes treatment, CVD/renal protection, and weight loss  DISCONTINUE metformin     Eda Ramon, PharmD   Clinical Pharmacist Specialist, Primary Care   Phone: [phone]   Fax: [phone]   Email: [email or web address]     GYNECOLOGIC ONCOLOGY CONSULT NOTE    35y  LMP 2021 with long history of chronic pelvic pain with recurrent ovarian cysts and multiple laparoscopies presenting with acute onset of severe suprapubic pain while driving. Pain is intermittent in quality. Had to pull over, felt like it took breath away, felt flush and was very concerned that the last time she had these symptoms she underwent a laparoscopy where an ovarian torsion was identified and her right ovary was removed ( 2019). Reports history of chronic pain impacting quality of life. Desires a hysterectomy and oophorectomy but is currently scheduled to see a hormone specialist to attempt medical management of her chronic pain symptoms and re-ocurrent ovarian cyst history. She is currently take Lutera. Menses are irregular and associated with heavy vaginal bleeding and painful cramps. Reports history of flatus and bowel movement today. Denies dysuria, fevers, chills, shortness of breath, chest pain or calf pain.    Of note, patient received morphine and Toradol for pain control and reports improvement in symptoms, however is very anxious about her chronic pain and needs help with outpatient pain management.      OB/GYN HISTORY: 2019 last pap smear, no history of abnormal labs     Surgical History:    Appendectomy    delivery   Oophorectomy  Laparoscopy (many)     Past Medical History:   Chronic Pelvic pain     Allergies: acetaminophen (Hives)    FAMILY HISTORY: Cardiac History, Diabetes Mellitus, Polycystic kidney disease      Social History: Former smoker, sexually active    REVIEW OF SYSTEMS: see HPI    OBJECTIVE FINDINGS:  Vital Signs Last 24 Hrs  T(C): 36.7 (2021 16:56), Max: 36.7 (2021 16:56)  T(F): 98 (2021 16:56), Max: 98 (2021 16:56)  HR: 81 (2021 16:56) (81 - 81)  BP: 132/87 (2021 16:56) (132/87 - 132/87)  RR: 18 (2021 16:56) (18 - 18)  SpO2: 100% (2021 16:56) (100% - 100%)    PHYSICAL EXAM:    GENERAL: tearful, well-developed  HEAD:  Atraumatic, Normocephalic  NERVOUS SYSTEM:  Alert & Oriented X3, Good concentration and speaking in full sentences  CHEST/LUNG: no work of breathing  HEART: Regular rate and rhythm; No murmurs, rubs, or gallops  ABDOMEN: Soft, tenderness to palpation in left lower quadrant, no guarding, no rebound  EXTREMITIES:  no edema   LYMPH: No lymphadenopathy noted  SKIN: No rashes or lesions  PELVIC: deferred         LABS:                        14.9   7.40  )-----------( 290      ( 2021 18:16 )             44.3         138  |  101  |  18.0  ----------------------------<  87  3.8   |  26.0  |  0.86    Ca    9.1      2021 18:16    TPro  7.5  /  Alb  4.6  /  TBili  0.5  /  DBili  x   /  AST  18  /  ALT  14  /  AlkPhos  50        Urinalysis Basic - ( 2021 18:21 )    Color: Yellow / Appearance: Clear / S.005 / pH: x  Gluc: x / Ketone: Small  / Bili: Negative / Urobili: Negative mg/dL   Blood: x / Protein: Negative mg/dL / Nitrite: Negative   Leuk Esterase: Negative / RBC: x / WBC x   Sq Epi: x / Non Sq Epi: x / Bacteria: x        RADIOLOGY & ADDITIONAL STUDIES:  < from: US Transvaginal (21 @ 19:11) >   EXAM:  US PELVIC COMPLETE                         EXAM:  US TRANSVAGINAL                          PROCEDURE DATE:  2021        INTERPRETATION:  CLINICAL INFORMATION: Pain. Rule out torsion    LMP: 2021    COMPARISON: 2019    TECHNIQUE:  Endovaginal and transabdominal pelvic sonogram. Color and Spectral Doppler was performed.    FINDINGS:    Uterus: 8.4 cm x 4.8 cm x 5.8 cm. Within normal limits.  Endometrium: 6 mm. Within normal limits.    Right ovary: Not identified. Patient reports history of right oophorectomy. There is no adnexal mass.  Left ovary: 3.2 cm x 2.3 cm x 1.7 cm. corpus luteum. Normal arterial and venous waveforms.    Fluid: None.    IMPRESSION:  Normal appearance of the uterus.  Left ovary demonstrates no evidence of torsion.  Right ovary is not seen and by history is surgically absent secondary to torsion in 2019.      JOSÉ CASTELLANOS MD; Attending Radiologist  This document has been electronically signed. 2021  7:32PM    < end of copied text >
